# Patient Record
Sex: FEMALE | Race: WHITE | Employment: OTHER | URBAN - METROPOLITAN AREA
[De-identification: names, ages, dates, MRNs, and addresses within clinical notes are randomized per-mention and may not be internally consistent; named-entity substitution may affect disease eponyms.]

---

## 2018-05-03 ENCOUNTER — HOSPITAL ENCOUNTER (EMERGENCY)
Age: 63
Discharge: HOME | End: 2018-05-03
Payer: COMMERCIAL

## 2018-05-03 VITALS
DIASTOLIC BLOOD PRESSURE: 79 MMHG | RESPIRATION RATE: 18 BRPM | SYSTOLIC BLOOD PRESSURE: 136 MMHG | WEIGHT: 230 LBS | HEART RATE: 68 BPM | OXYGEN SATURATION: 97 % | BODY MASS INDEX: 37.12 KG/M2 | TEMPERATURE: 98.6 F

## 2018-05-03 DIAGNOSIS — N30.00 ACUTE CYSTITIS WITHOUT HEMATURIA: Primary | ICD-10-CM

## 2018-05-03 LAB
BACTERIA: ABNORMAL /HPF
BILIRUBIN URINE: NEGATIVE
BLOOD, URINE: ABNORMAL
CLARITY: ABNORMAL
COLOR: YELLOW
EPITHELIAL CELLS, UA: ABNORMAL /HPF
GLUCOSE URINE: NEGATIVE MG/DL
KETONES, URINE: NEGATIVE MG/DL
LEUKOCYTE ESTERASE, URINE: ABNORMAL
NITRITE, URINE: NEGATIVE
PH UA: 5.5 (ref 5–9)
PROTEIN UA: NEGATIVE MG/DL
RBC UA: ABNORMAL /HPF (ref 0–2)
SPECIFIC GRAVITY UA: 1.01 (ref 1–1.03)
UROBILINOGEN, URINE: 0.2 E.U./DL
WBC UA: >20 /HPF (ref 0–5)

## 2018-05-03 PROCEDURE — 81001 URINALYSIS AUTO W/SCOPE: CPT

## 2018-05-03 PROCEDURE — 87186 SC STD MICRODIL/AGAR DIL: CPT

## 2018-05-03 PROCEDURE — 99212 OFFICE O/P EST SF 10 MIN: CPT

## 2018-05-03 PROCEDURE — 87077 CULTURE AEROBIC IDENTIFY: CPT

## 2018-05-03 PROCEDURE — 87088 URINE BACTERIA CULTURE: CPT

## 2018-05-03 RX ORDER — CALCIUM CARBONATE 300MG(750)
TABLET,CHEWABLE ORAL EVERY OTHER DAY
COMMUNITY

## 2018-05-03 RX ORDER — CEPHALEXIN 500 MG/1
500 CAPSULE ORAL 3 TIMES DAILY
Qty: 21 CAPSULE | Refills: 0 | Status: SHIPPED | OUTPATIENT
Start: 2018-05-03 | End: 2018-05-10

## 2018-05-03 ASSESSMENT — PAIN DESCRIPTION - LOCATION: LOCATION: PERINEUM

## 2018-05-03 ASSESSMENT — PAIN SCALES - GENERAL: PAINLEVEL_OUTOF10: 8

## 2018-05-03 ASSESSMENT — PAIN DESCRIPTION - DESCRIPTORS: DESCRIPTORS: BURNING

## 2018-05-05 LAB
ORGANISM: ABNORMAL
URINE CULTURE, ROUTINE: ABNORMAL
URINE CULTURE, ROUTINE: ABNORMAL

## 2019-01-03 ENCOUNTER — HOSPITAL ENCOUNTER (OUTPATIENT)
Age: 64
Discharge: HOME | End: 2019-01-05
Payer: COMMERCIAL

## 2019-01-03 LAB
ALBUMIN SERPL-MCNC: 4 G/DL (ref 3.5–5.2)
ALP BLD-CCNC: 64 U/L (ref 35–104)
ALT SERPL-CCNC: 21 U/L (ref 0–32)
ANION GAP SERPL CALCULATED.3IONS-SCNC: 16 MMOL/L (ref 7–16)
AST SERPL-CCNC: 24 U/L (ref 0–31)
BASOPHILS ABSOLUTE: 0.02 E9/L (ref 0–0.2)
BASOPHILS RELATIVE PERCENT: 0.3 % (ref 0–2)
BILIRUB SERPL-MCNC: 0.4 MG/DL (ref 0–1.2)
BUN BLDV-MCNC: 12 MG/DL (ref 8–23)
CALCIUM SERPL-MCNC: 9 MG/DL (ref 8.6–10.2)
CHLORIDE BLD-SCNC: 100 MMOL/L (ref 98–107)
CHOLESTEROL, TOTAL: 156 MG/DL (ref 0–199)
CO2: 22 MMOL/L (ref 22–29)
CREAT SERPL-MCNC: 0.9 MG/DL (ref 0.5–1)
EOSINOPHILS ABSOLUTE: 0.14 E9/L (ref 0.05–0.5)
EOSINOPHILS RELATIVE PERCENT: 2.3 % (ref 0–6)
GFR AFRICAN AMERICAN: >60
GFR NON-AFRICAN AMERICAN: >60 ML/MIN/1.73
GLUCOSE BLD-MCNC: 91 MG/DL (ref 74–99)
HCT VFR BLD CALC: 40.4 % (ref 34–48)
HDLC SERPL-MCNC: 44 MG/DL
HEMOGLOBIN: 13 G/DL (ref 11.5–15.5)
IMMATURE GRANULOCYTES #: 0.02 E9/L
IMMATURE GRANULOCYTES %: 0.3 % (ref 0–5)
LDL CHOLESTEROL CALCULATED: 66 MG/DL (ref 0–99)
LYMPHOCYTES ABSOLUTE: 1.53 E9/L (ref 1.5–4)
LYMPHOCYTES RELATIVE PERCENT: 25.4 % (ref 20–42)
MCH RBC QN AUTO: 30.5 PG (ref 26–35)
MCHC RBC AUTO-ENTMCNC: 32.2 % (ref 32–34.5)
MCV RBC AUTO: 94.8 FL (ref 80–99.9)
MONOCYTES ABSOLUTE: 0.63 E9/L (ref 0.1–0.95)
MONOCYTES RELATIVE PERCENT: 10.4 % (ref 2–12)
NEUTROPHILS ABSOLUTE: 3.69 E9/L (ref 1.8–7.3)
NEUTROPHILS RELATIVE PERCENT: 61.3 % (ref 43–80)
PDW BLD-RTO: 13.2 FL (ref 11.5–15)
PLATELET # BLD: 387 E9/L (ref 130–450)
PMV BLD AUTO: 9.7 FL (ref 7–12)
POTASSIUM SERPL-SCNC: 4.3 MMOL/L (ref 3.5–5)
RBC # BLD: 4.26 E12/L (ref 3.5–5.5)
SODIUM BLD-SCNC: 138 MMOL/L (ref 132–146)
TOTAL PROTEIN: 7.2 G/DL (ref 6.4–8.3)
TRIGL SERPL-MCNC: 230 MG/DL (ref 0–149)
TSH SERPL DL<=0.05 MIU/L-ACNC: 2.55 UIU/ML (ref 0.27–4.2)
VITAMIN D 25-HYDROXY: 34 NG/ML (ref 30–100)
VLDLC SERPL CALC-MCNC: 46 MG/DL
WBC # BLD: 6 E9/L (ref 4.5–11.5)

## 2019-01-03 PROCEDURE — 80053 COMPREHEN METABOLIC PANEL: CPT

## 2019-01-03 PROCEDURE — 85025 COMPLETE CBC W/AUTO DIFF WBC: CPT

## 2019-01-03 PROCEDURE — 82306 VITAMIN D 25 HYDROXY: CPT

## 2019-01-03 PROCEDURE — 84443 ASSAY THYROID STIM HORMONE: CPT

## 2019-01-03 PROCEDURE — 80061 LIPID PANEL: CPT

## 2019-07-10 ENCOUNTER — HOSPITAL ENCOUNTER (OUTPATIENT)
Age: 64
Discharge: HOME | End: 2019-07-12
Payer: COMMERCIAL

## 2019-07-10 LAB
ALBUMIN SERPL-MCNC: 4.3 G/DL (ref 3.5–5.2)
ALP BLD-CCNC: 63 U/L (ref 35–104)
ALT SERPL-CCNC: 22 U/L (ref 0–32)
ANION GAP SERPL CALCULATED.3IONS-SCNC: 18 MMOL/L (ref 7–16)
AST SERPL-CCNC: 27 U/L (ref 0–31)
BASOPHILS ABSOLUTE: 0.03 E9/L (ref 0–0.2)
BASOPHILS RELATIVE PERCENT: 0.6 % (ref 0–2)
BILIRUB SERPL-MCNC: <0.2 MG/DL (ref 0–1.2)
BUN BLDV-MCNC: 14 MG/DL (ref 8–23)
CALCIUM SERPL-MCNC: 9.3 MG/DL (ref 8.6–10.2)
CHLORIDE BLD-SCNC: 102 MMOL/L (ref 98–107)
CHOLESTEROL, TOTAL: 189 MG/DL (ref 0–199)
CO2: 21 MMOL/L (ref 22–29)
CREAT SERPL-MCNC: 0.9 MG/DL (ref 0.5–1)
EOSINOPHILS ABSOLUTE: 0.14 E9/L (ref 0.05–0.5)
EOSINOPHILS RELATIVE PERCENT: 2.7 % (ref 0–6)
GFR AFRICAN AMERICAN: >60
GFR NON-AFRICAN AMERICAN: >60 ML/MIN/1.73
GLUCOSE BLD-MCNC: 86 MG/DL (ref 74–99)
HBA1C MFR BLD: 5.8 % (ref 4–5.6)
HCT VFR BLD CALC: 41.4 % (ref 34–48)
HDLC SERPL-MCNC: 43 MG/DL
HEMOGLOBIN: 13.3 G/DL (ref 11.5–15.5)
IMMATURE GRANULOCYTES #: 0.01 E9/L
IMMATURE GRANULOCYTES %: 0.2 % (ref 0–5)
LDL CHOLESTEROL CALCULATED: 92 MG/DL (ref 0–99)
LYMPHOCYTES ABSOLUTE: 1.17 E9/L (ref 1.5–4)
LYMPHOCYTES RELATIVE PERCENT: 22.8 % (ref 20–42)
MCH RBC QN AUTO: 30.9 PG (ref 26–35)
MCHC RBC AUTO-ENTMCNC: 32.1 % (ref 32–34.5)
MCV RBC AUTO: 96.1 FL (ref 80–99.9)
MONOCYTES ABSOLUTE: 0.57 E9/L (ref 0.1–0.95)
MONOCYTES RELATIVE PERCENT: 11.1 % (ref 2–12)
NEUTROPHILS ABSOLUTE: 3.21 E9/L (ref 1.8–7.3)
NEUTROPHILS RELATIVE PERCENT: 62.6 % (ref 43–80)
PDW BLD-RTO: 12.9 FL (ref 11.5–15)
PLATELET # BLD: 368 E9/L (ref 130–450)
PMV BLD AUTO: 9.6 FL (ref 7–12)
POTASSIUM SERPL-SCNC: 4.4 MMOL/L (ref 3.5–5)
RBC # BLD: 4.31 E12/L (ref 3.5–5.5)
SODIUM BLD-SCNC: 141 MMOL/L (ref 132–146)
TOTAL PROTEIN: 7.5 G/DL (ref 6.4–8.3)
TRIGL SERPL-MCNC: 271 MG/DL (ref 0–149)
TSH SERPL DL<=0.05 MIU/L-ACNC: 1.86 UIU/ML (ref 0.27–4.2)
VITAMIN D 25-HYDROXY: 39 NG/ML (ref 30–100)
VLDLC SERPL CALC-MCNC: 54 MG/DL
WBC # BLD: 5.1 E9/L (ref 4.5–11.5)

## 2019-07-10 PROCEDURE — 84443 ASSAY THYROID STIM HORMONE: CPT

## 2019-07-10 PROCEDURE — 80053 COMPREHEN METABOLIC PANEL: CPT

## 2019-07-10 PROCEDURE — 83036 HEMOGLOBIN GLYCOSYLATED A1C: CPT

## 2019-07-10 PROCEDURE — 82306 VITAMIN D 25 HYDROXY: CPT

## 2019-07-10 PROCEDURE — 85025 COMPLETE CBC W/AUTO DIFF WBC: CPT

## 2019-07-10 PROCEDURE — 80061 LIPID PANEL: CPT

## 2019-07-22 ENCOUNTER — HOSPITAL ENCOUNTER (OUTPATIENT)
Dept: MAMMOGRAPHY | Age: 64
Discharge: HOME | End: 2019-07-24
Payer: COMMERCIAL

## 2019-07-22 DIAGNOSIS — Z12.31 SCREENING MAMMOGRAM, ENCOUNTER FOR: ICD-10-CM

## 2019-07-22 PROCEDURE — 77063 BREAST TOMOSYNTHESIS BI: CPT

## 2020-07-24 ENCOUNTER — HOSPITAL ENCOUNTER (OUTPATIENT)
Dept: PREADMISSION TESTING | Age: 65
Discharge: HOME | End: 2020-07-24
Payer: MEDICAID

## 2020-07-24 ENCOUNTER — ANESTHESIA EVENT (OUTPATIENT)
Dept: OPERATING ROOM | Age: 65
DRG: 302 | End: 2020-07-24
Payer: MEDICAID

## 2020-07-24 ENCOUNTER — HOSPITAL ENCOUNTER (OUTPATIENT)
Dept: GENERAL RADIOLOGY | Age: 65
Discharge: HOME | End: 2020-07-26
Payer: MEDICAID

## 2020-07-24 ENCOUNTER — HOSPITAL ENCOUNTER (OUTPATIENT)
Age: 65
Discharge: HOME | End: 2020-07-26
Payer: MEDICAID

## 2020-07-24 ENCOUNTER — HOSPITAL ENCOUNTER (OUTPATIENT)
Dept: GENERAL RADIOLOGY | Age: 65
End: 2020-07-24
Payer: MEDICAID

## 2020-07-24 VITALS
DIASTOLIC BLOOD PRESSURE: 66 MMHG | HEART RATE: 88 BPM | TEMPERATURE: 97.5 F | SYSTOLIC BLOOD PRESSURE: 145 MMHG | OXYGEN SATURATION: 96 % | RESPIRATION RATE: 18 BRPM | BODY MASS INDEX: 36.66 KG/M2 | WEIGHT: 233.56 LBS | HEIGHT: 67 IN

## 2020-07-24 LAB
ANION GAP SERPL CALCULATED.3IONS-SCNC: 13 MMOL/L (ref 7–16)
APTT: 33.1 SEC (ref 24.5–35.1)
BASOPHILS ABSOLUTE: 0.02 E9/L (ref 0–0.2)
BASOPHILS RELATIVE PERCENT: 0.3 % (ref 0–2)
BUN BLDV-MCNC: 13 MG/DL (ref 8–23)
CALCIUM SERPL-MCNC: 9.6 MG/DL (ref 8.6–10.2)
CHLORIDE BLD-SCNC: 97 MMOL/L (ref 98–107)
CO2: 28 MMOL/L (ref 22–29)
CREAT SERPL-MCNC: 0.9 MG/DL (ref 0.5–1)
EKG ATRIAL RATE: 79 BPM
EKG P AXIS: 47 DEGREES
EKG P-R INTERVAL: 136 MS
EKG Q-T INTERVAL: 412 MS
EKG QRS DURATION: 90 MS
EKG QTC CALCULATION (BAZETT): 472 MS
EKG R AXIS: 7 DEGREES
EKG T AXIS: 31 DEGREES
EKG VENTRICULAR RATE: 79 BPM
EOSINOPHILS ABSOLUTE: 0.1 E9/L (ref 0.05–0.5)
EOSINOPHILS RELATIVE PERCENT: 1.5 % (ref 0–6)
GFR AFRICAN AMERICAN: >60
GFR NON-AFRICAN AMERICAN: >60 ML/MIN/1.73
GLUCOSE BLD-MCNC: 119 MG/DL (ref 74–99)
HCT VFR BLD CALC: 38.9 % (ref 34–48)
HEMOGLOBIN: 12.2 G/DL (ref 11.5–15.5)
IMMATURE GRANULOCYTES #: 0.02 E9/L
IMMATURE GRANULOCYTES %: 0.3 % (ref 0–5)
INR BLD: 1.1
LYMPHOCYTES ABSOLUTE: 1.47 E9/L (ref 1.5–4)
LYMPHOCYTES RELATIVE PERCENT: 22.7 % (ref 20–42)
MCH RBC QN AUTO: 28.6 PG (ref 26–35)
MCHC RBC AUTO-ENTMCNC: 31.4 % (ref 32–34.5)
MCV RBC AUTO: 91.3 FL (ref 80–99.9)
MONOCYTES ABSOLUTE: 0.49 E9/L (ref 0.1–0.95)
MONOCYTES RELATIVE PERCENT: 7.6 % (ref 2–12)
NEUTROPHILS ABSOLUTE: 4.37 E9/L (ref 1.8–7.3)
NEUTROPHILS RELATIVE PERCENT: 67.6 % (ref 43–80)
PDW BLD-RTO: 13.8 FL (ref 11.5–15)
PLATELET # BLD: 472 E9/L (ref 130–450)
PMV BLD AUTO: 8.6 FL (ref 7–12)
POTASSIUM REFLEX MAGNESIUM: 4.2 MMOL/L (ref 3.5–5)
PROTHROMBIN TIME: 12.1 SEC (ref 9.3–12.4)
RBC # BLD: 4.26 E12/L (ref 3.5–5.5)
SODIUM BLD-SCNC: 138 MMOL/L (ref 132–146)
WBC # BLD: 6.5 E9/L (ref 4.5–11.5)

## 2020-07-24 PROCEDURE — 85025 COMPLETE CBC W/AUTO DIFF WBC: CPT

## 2020-07-24 PROCEDURE — 36415 COLL VENOUS BLD VENIPUNCTURE: CPT

## 2020-07-24 PROCEDURE — 85730 THROMBOPLASTIN TIME PARTIAL: CPT

## 2020-07-24 PROCEDURE — 71046 X-RAY EXAM CHEST 2 VIEWS: CPT

## 2020-07-24 PROCEDURE — 80048 BASIC METABOLIC PNL TOTAL CA: CPT

## 2020-07-24 PROCEDURE — 87081 CULTURE SCREEN ONLY: CPT

## 2020-07-24 PROCEDURE — 93005 ELECTROCARDIOGRAM TRACING: CPT | Performed by: ANESTHESIOLOGY

## 2020-07-24 PROCEDURE — 85610 PROTHROMBIN TIME: CPT

## 2020-07-24 PROCEDURE — U0003 INFECTIOUS AGENT DETECTION BY NUCLEIC ACID (DNA OR RNA); SEVERE ACUTE RESPIRATORY SYNDROME CORONAVIRUS 2 (SARS-COV-2) (CORONAVIRUS DISEASE [COVID-19]), AMPLIFIED PROBE TECHNIQUE, MAKING USE OF HIGH THROUGHPUT TECHNOLOGIES AS DESCRIBED BY CMS-2020-01-R: HCPCS

## 2020-07-24 RX ORDER — ROPIVACAINE HYDROCHLORIDE 5 MG/ML
30 INJECTION, SOLUTION EPIDURAL; INFILTRATION; PERINEURAL
Status: CANCELLED | OUTPATIENT
Start: 2020-07-24 | End: 2020-07-24

## 2020-07-24 RX ORDER — POTASSIUM CHLORIDE 1500 MG/1
20 TABLET, FILM COATED, EXTENDED RELEASE ORAL
COMMUNITY

## 2020-07-24 RX ORDER — GLYCOPYRROLATE 1 MG/1
2 TABLET ORAL 2 TIMES DAILY
COMMUNITY
End: 2021-11-22

## 2020-07-24 RX ORDER — MAGNESIUM OXIDE 400 MG/1
250 TABLET ORAL DAILY
COMMUNITY

## 2020-07-24 RX ORDER — GABAPENTIN 300 MG/1
600 CAPSULE ORAL ONCE
Status: CANCELLED | OUTPATIENT
Start: 2020-07-24 | End: 2020-07-24

## 2020-07-24 RX ORDER — MIDAZOLAM HYDROCHLORIDE 1 MG/ML
1 INJECTION INTRAMUSCULAR; INTRAVENOUS EVERY 5 MIN PRN
Status: CANCELLED | OUTPATIENT
Start: 2020-07-24

## 2020-07-24 RX ORDER — FENTANYL CITRATE 50 UG/ML
100 INJECTION, SOLUTION INTRAMUSCULAR; INTRAVENOUS ONCE
Status: CANCELLED | OUTPATIENT
Start: 2020-07-24 | End: 2020-07-24

## 2020-07-24 NOTE — ANESTHESIA PRE PROCEDURE
Department of Anesthesiology  Preprocedure Note       Name:  Celine Domingo   Age:  59 y.o.  :  1955                                          MRN:  [id number]         Date:  2020      Surgeon: Linda Saleh):  Sierra Nissen, DO    Procedure: Procedure(s):  RIGHT TOTAL KNEE ARTHROPLASTY (DEPUY)    Medications prior to admission:   Prior to Admission medications    Medication Sig Start Date End Date Taking? Authorizing Provider   potassium chloride (KLOR-CON M) 20 MEQ TBCR extended release tablet Take 20 mEq by mouth daily (with breakfast)   Yes Historical Provider, MD   Lactobacillus (ACIDOPHILUS PO) Take 25 Million Cells by mouth daily 1 or 2   Yes Historical Provider, MD   magnesium oxide (MAG-OX) 400 MG tablet Take 400 mg by mouth daily   Yes Historical Provider, MD   glycopyrrolate (ROBINUL) 1 MG tablet Take 2 mg by mouth 2 times daily   Yes Historical Provider, MD   Melatonin 10 MG TBDP Take by mouth nightly     Historical Provider, MD   Multiple Vitamins-Minerals (HAIR SKIN AND NAILS FORMULA) TABS Take by mouth    Historical Provider, MD   vitamin B-12 (CYANOCOBALAMIN) 1000 MCG tablet Take 1,000 mcg by mouth daily. Historical Provider, MD   vitamin E 400 UNIT capsule Take 400 Units by mouth 2 times daily. Last dose 2012    Historical Provider, MD   Mesalamine (ASACOL HD) 800 MG TBEC Take 1 tablet by mouth 2 times daily. Historical Provider, MD   hydrOXYzine (ATARAX) 25 MG tablet Take 25 mg by mouth 2 times daily Takes one tablet in AM, two tablets in the PM    Historical Provider, MD   paroxetine (PAXIL) 20 MG tablet Take 25 mg by mouth every morning     Historical Provider, MD   acyclovir (ZOVIRAX) 800 MG tablet Take 800 mg by mouth daily. Historical Provider, MD   lisinopril (PRINIVIL;ZESTRIL) 10 MG tablet Take 10 mg by mouth daily. Historical Provider, MD   Cholecalciferol (VITAMIN D3) 2000 UNITS CAPS Take 1 tablet by mouth daily.     Historical Provider, MD   simvastatin (ZOCOR) 40 MG tablet Take 40 mg by mouth nightly. Historical Provider, MD   lansoprazole (PREVACID SOLUTAB) 30 MG disintegrating tablet Take 30 mg by mouth daily. Nightly     Historical Provider, MD   spironolactone (ALDACTONE) 25 MG tablet Take 25 mg by mouth 2 times daily. Historical Provider, MD   olopatadine (PATANOL) 0.1 % ophthalmic solution Place 1 drop into both eyes as needed. Historical Provider, MD       Current medications:    Current Outpatient Medications   Medication Sig Dispense Refill    potassium chloride (KLOR-CON M) 20 MEQ TBCR extended release tablet Take 20 mEq by mouth daily (with breakfast)      Lactobacillus (ACIDOPHILUS PO) Take 25 Million Cells by mouth daily 1 or 2      magnesium oxide (MAG-OX) 400 MG tablet Take 400 mg by mouth daily      glycopyrrolate (ROBINUL) 1 MG tablet Take 2 mg by mouth 2 times daily      Melatonin 10 MG TBDP Take by mouth nightly       Multiple Vitamins-Minerals (HAIR SKIN AND NAILS FORMULA) TABS Take by mouth      vitamin B-12 (CYANOCOBALAMIN) 1000 MCG tablet Take 1,000 mcg by mouth daily.  vitamin E 400 UNIT capsule Take 400 Units by mouth 2 times daily. Last dose 11/8/2012      Mesalamine (ASACOL HD) 800 MG TBEC Take 1 tablet by mouth 2 times daily.  hydrOXYzine (ATARAX) 25 MG tablet Take 25 mg by mouth 2 times daily Takes one tablet in AM, two tablets in the PM      paroxetine (PAXIL) 20 MG tablet Take 25 mg by mouth every morning       acyclovir (ZOVIRAX) 800 MG tablet Take 800 mg by mouth daily.  lisinopril (PRINIVIL;ZESTRIL) 10 MG tablet Take 10 mg by mouth daily.  Cholecalciferol (VITAMIN D3) 2000 UNITS CAPS Take 1 tablet by mouth daily.  simvastatin (ZOCOR) 40 MG tablet Take 40 mg by mouth nightly.  lansoprazole (PREVACID SOLUTAB) 30 MG disintegrating tablet Take 30 mg by mouth daily. Nightly       spironolactone (ALDACTONE) 25 MG tablet Take 25 mg by mouth 2 times daily.       olopatadine (PATANOL) 0.1 % ophthalmic solution Place 1 drop into both eyes as needed. No current facility-administered medications for this encounter. Allergies: Allergies   Allergen Reactions    Aspirin Other (See Comments)     bleeding    Iodine Hives and Swelling     Betadine   Shellfish    Medrol [Methylprednisolone] Anaphylaxis    Pork-Derived Products Swelling     Pig gut sutures    Rice Swelling    Sulfa Antibiotics Hives and Swelling    Tape Jaison Dye Tape] Rash    Codeine      Gallbladder attack    Darvocet [Propoxyphene N-Acetaminophen]      Crohn's affected    Other      bandaids rash    Plasticized Base [Plastibase]        Problem List:    Patient Active Problem List   Diagnosis Code    Crohn disease ([facility] K50.90    Hypertension I10    [facility] J44.9    Arthritis M19.90    Hyperlipidemia E78.5    GERD (gastroesophageal reflux disease) K21.9    Anxiety and depression F41.9, F32.9    Status post knee surgery Z98.890    Hypotension I95.9       Past Medical History:        Diagnosis Date    Anxiety and depression     Arthritis     Asthma     COPD (chronic obstructive pulmonary disease) (HCC)     Crohn disease (HCC)     IBS    GERD (gastroesophageal reflux disease)     Hyperlipidemia     Hypertension     Hypoglycemia     Nausea & vomiting     Sleep apnea     Thyroid disease     hx/no tx @ present       Past Surgical History:        Procedure Laterality Date    BLADDER SURGERY  bladder bx benign and urethra stretched    BLADDER SUSPENSION      cystocele repair also    COLONOSCOPY      ENDOSCOPY, COLON, DIAGNOSTIC      FRACTURE SURGERY      Rt.  Elbow    HYSTERECTOMY      partial    JOINT REPLACEMENT Left     knee    KNEE ARTHROSCOPY  06-04-12    left    KNEE ARTHROSCOPY      left    LITHOTRIPSY  12/20/2013    SKIN BIOPSY      TOTAL KNEE ARTHROPLASTY      Left Knee    UPPER GASTROINTESTINAL ENDOSCOPY         Social [address]    Drug/Infectious Status (If Applicable):  No results found for: HIV, HEPCAB    COVID-19 Screening (If Applicable): No results found for: COVID19      Anesthesia Evaluation  Patient summary reviewed history of anesthetic complications (Spinal Headache): Airway: Mallampati: II  TM distance: >3 FB   Neck ROM: full  Mouth opening: > = 3 FB Dental:    (+) upper dentures and partials  Comment: Upper Dentures / Lower Partial    Pulmonary: breath sounds clear to auscultation  (+) COPD:  sleep apnea: on CPAP,                            ROS comment: COVID TEST ORDERED 7/24/2020 - Neg   Cardiovascular:    (+) hypertension:, hyperlipidemia      ECG reviewed  Rhythm: regular  Rate: normal                    Neuro/Psych:   (+) depression/anxiety             GI/Hepatic/Renal:   (+) GERD:, renal disease: CRI, morbid obesity         ROS comment: Crohn disease (HCC)  IBS . Endo/Other:    (+) : arthritis: OA., .                  ROS comment: Hypoglycemia Abdominal:   (+) obese,         Vascular: negative vascular ROS. Anesthesia Plan      general     ASA 3             Anesthetic plan and risks discussed with patient. Plan discussed with CRNA. Patient agrees to RIGHT Adductor Canal Block for Post-op Pain Control and General Anesthesia (Refuses Spinal)      Christin Carter MD   7/24/2020    Pt seen, examined, chart reviewed, plan discussed.   No changes since seen in PAT on 7/24/2020    [facility]  7/30/2020  6:40 AM

## 2020-07-24 NOTE — PROGRESS NOTES
Left message at Dr. Olga Pineda office re: refusal to use/take inspirex, unable to use SHIVANI due to allergies.

## 2020-07-24 NOTE — PROGRESS NOTES
[address] [facility]                                                                                                                    PRE OP INSTRUCTIONS FOR  Eliz Parson        Date: 7/24/2020    Date of surgery: 7/30/2020   Arrival Time: Hospital will call you between 5pm and 7pm with your final arrival time for surgery    1. Do not eat or drink anything after midnight prior to surgery. This includes no water, chewing gum, mints or ice chips. 2. Take the following medications with a small sip of water on the morning of Surgery: pt does not want to take AM meds     3. Diabetics may take evening dose of insulin but none after midnight. If you feel symptomatic or low blood sugar morning of surgery drink 1-2 ounces of apple juice only. 4. Aspirin, Ibuprofen, Advil, Naproxen, Vitamin E and other Anti-inflammatory products should be stopped  before surgery  as directed by your physician. Take Tylenol only unless instructed otherwise by your surgeon. 5. Check with your Doctor regarding stopping Plavix, Coumadin, Lovenox, Eliquis, Effient, or other blood thinners. 6. Do not smoke,use illicit drugs and do not drink any alcoholic beverages 24 hours prior to surgery. 7. You may brush your teeth the morning of surgery. DO NOT SWALLOW WATER    8. You MUST make arrangements for a responsible adult to take you home after your surgery. You will not be allowed to leave alone or drive yourself home. It is strongly suggested someone stay with you the first 24 hrs. Your surgery will be cancelled if you do not have a ride home. 9. PEDIATRIC PATIENTS ONLY:  A parent/legal guardian must accompany a child scheduled for surgery and plan to stay at the hospital until the child is discharged. Please do not bring other children with you.     10. Please wear simple, loose fitting clothing to the hospital.  Do not bring valuables (money, credit cards, checkbooks, etc.) Do not wear any makeup (including

## 2020-07-24 NOTE — PROGRESS NOTES
Patient attended preoperative [facility] on 7/24/2020 during her preadmission testing appointment. Patient is scheduled to have an elective knee replacement. Patient was educated regarding Disease Process, Medications, Smoking Cessation, Oxygenation, Incentive Spirometry and Deep Breath and Cough, signs and symptoms of postoperative joint infection that include: Fever, Chills, Pain Control, Drainage and Redness, post-op follow up with orthopaedic surgeon, dressing removal, staple removal, ambulatory devices which include a standard walker and cane, bed mobility, correct anatomical alignment, active range of motion, proper transferring technique, incision care, infection prevention measures, non-pharmacologic comfort measures, notification of inadequate pain control measures, pain scale for assessing level of pain, pharmacologic pain management, relaxation techniques.

## 2020-07-26 LAB
MRSA CULTURE ONLY: NORMAL
SARS-COV-2: NOT DETECTED
SOURCE: NORMAL

## 2020-07-27 NOTE — PROGRESS NOTES
Crist Leyden at Dr. Robert Rizvi office notified that pt refuses a blood transfusion, refusal consent signed in PAT, and refusal to take SHIVANI wipes and allergy to betadine.

## 2020-07-30 ENCOUNTER — HOSPITAL ENCOUNTER (INPATIENT)
Age: 65
LOS: 1 days | Discharge: NURSING, REHAB OR LONG-TERM CARE | DRG: 302 | End: 2020-07-31
Attending: ORTHOPAEDIC SURGERY | Admitting: ORTHOPAEDIC SURGERY
Payer: MEDICAID

## 2020-07-30 ENCOUNTER — ANESTHESIA (OUTPATIENT)
Dept: OPERATING ROOM | Age: 65
DRG: 302 | End: 2020-07-30
Payer: MEDICAID

## 2020-07-30 ENCOUNTER — APPOINTMENT (OUTPATIENT)
Dept: GENERAL RADIOLOGY | Age: 65
DRG: 302 | End: 2020-07-30
Attending: ORTHOPAEDIC SURGERY
Payer: MEDICAID

## 2020-07-30 VITALS
DIASTOLIC BLOOD PRESSURE: 73 MMHG | SYSTOLIC BLOOD PRESSURE: 150 MMHG | RESPIRATION RATE: 14 BRPM | OXYGEN SATURATION: 99 %

## 2020-07-30 PROBLEM — M17.11 ARTHRITIS OF RIGHT KNEE: Status: ACTIVE | Noted: 2020-07-30

## 2020-07-30 LAB — METER GLUCOSE: 118 MG/DL (ref 74–99)

## 2020-07-30 PROCEDURE — 6370000000 HC RX 637 (ALT 250 FOR IP): Performed by: ORTHOPAEDIC SURGERY

## 2020-07-30 PROCEDURE — 73560 X-RAY EXAM OF KNEE 1 OR 2: CPT

## 2020-07-30 PROCEDURE — 6360000002 HC RX W HCPCS: Performed by: ORTHOPAEDIC SURGERY

## 2020-07-30 PROCEDURE — 0SRC0J9 REPLACEMENT OF RIGHT KNEE JOINT WITH SYNTHETIC SUBSTITUTE, CEMENTED, OPEN APPROACH: ICD-10-PCS | Performed by: ORTHOPAEDIC SURGERY

## 2020-07-30 PROCEDURE — 2580000003 HC RX 258: Performed by: ORTHOPAEDIC SURGERY

## 2020-07-30 PROCEDURE — 3E0T3BZ INTRODUCTION OF ANESTHETIC AGENT INTO PERIPHERAL NERVES AND PLEXI, PERCUTANEOUS APPROACH: ICD-10-PCS | Performed by: ORTHOPAEDIC SURGERY

## 2020-07-30 PROCEDURE — 3600000015 HC SURGERY LEVEL 5 ADDTL 15MIN: Performed by: ORTHOPAEDIC SURGERY

## 2020-07-30 PROCEDURE — 2500000003 HC RX 250 WO HCPCS

## 2020-07-30 PROCEDURE — 97110 THERAPEUTIC EXERCISES: CPT | Performed by: PHYSICAL THERAPIST

## 2020-07-30 PROCEDURE — 7100000001 HC PACU RECOVERY - ADDTL 15 MIN: Performed by: ORTHOPAEDIC SURGERY

## 2020-07-30 PROCEDURE — 6370000000 HC RX 637 (ALT 250 FOR IP): Performed by: STUDENT IN AN ORGANIZED HEALTH CARE EDUCATION/TRAINING PROGRAM

## 2020-07-30 PROCEDURE — 6360000002 HC RX W HCPCS: Performed by: ANESTHESIOLOGY

## 2020-07-30 PROCEDURE — 1200000000 HC SEMI PRIVATE

## 2020-07-30 PROCEDURE — 64447 NJX AA&/STRD FEMORAL NRV IMG: CPT | Performed by: ANESTHESIOLOGY

## 2020-07-30 PROCEDURE — 3600000005 HC SURGERY LEVEL 5 BASE: Performed by: ORTHOPAEDIC SURGERY

## 2020-07-30 PROCEDURE — 6360000002 HC RX W HCPCS

## 2020-07-30 PROCEDURE — 97161 PT EVAL LOW COMPLEX 20 MIN: CPT | Performed by: PHYSICAL THERAPIST

## 2020-07-30 PROCEDURE — C1776 JOINT DEVICE (IMPLANTABLE): HCPCS | Performed by: ORTHOPAEDIC SURGERY

## 2020-07-30 PROCEDURE — 88311 DECALCIFY TISSUE: CPT

## 2020-07-30 PROCEDURE — 97530 THERAPEUTIC ACTIVITIES: CPT

## 2020-07-30 PROCEDURE — 2709999900 HC NON-CHARGEABLE SUPPLY: Performed by: ORTHOPAEDIC SURGERY

## 2020-07-30 PROCEDURE — 88305 TISSUE EXAM BY PATHOLOGIST: CPT

## 2020-07-30 PROCEDURE — 82962 GLUCOSE BLOOD TEST: CPT

## 2020-07-30 PROCEDURE — 7100000000 HC PACU RECOVERY - FIRST 15 MIN: Performed by: ORTHOPAEDIC SURGERY

## 2020-07-30 PROCEDURE — 3700000000 HC ANESTHESIA ATTENDED CARE: Performed by: ORTHOPAEDIC SURGERY

## 2020-07-30 PROCEDURE — 97530 THERAPEUTIC ACTIVITIES: CPT | Performed by: PHYSICAL THERAPIST

## 2020-07-30 PROCEDURE — 3700000001 HC ADD 15 MINUTES (ANESTHESIA): Performed by: ORTHOPAEDIC SURGERY

## 2020-07-30 PROCEDURE — 97165 OT EVAL LOW COMPLEX 30 MIN: CPT

## 2020-07-30 DEVICE — INSERT TIB SZ 6 THK10MM KNEE POST STBL FIX BEAR ATTUNE: Type: IMPLANTABLE DEVICE | Site: KNEE | Status: FUNCTIONAL

## 2020-07-30 DEVICE — CEMENT BNE 40GM FULL DOSE PMMA W/O GENT M VISC N RADPQ LNG: Type: IMPLANTABLE DEVICE | Site: KNEE | Status: FUNCTIONAL

## 2020-07-30 DEVICE — COMPONENT PAT DIA32MM KNEE POLY CEM MEDIALIZED ANAT ATTUNE: Type: IMPLANTABLE DEVICE | Site: PATELLA | Status: FUNCTIONAL

## 2020-07-30 DEVICE — BASEPLATE TIB SZ 5 FIX BEAR CEM S+ TECHNOLOGY ATTUNE: Type: IMPLANTABLE DEVICE | Site: KNEE | Status: FUNCTIONAL

## 2020-07-30 DEVICE — COMPONENT FEM SZ 6 R KNEE POST STBL CEM ATTUNE: Type: IMPLANTABLE DEVICE | Site: KNEE | Status: FUNCTIONAL

## 2020-07-30 DEVICE — KNEE K1 TOT HEMI STD CEM IMPL CAPPED SYNTHES: Type: IMPLANTABLE DEVICE | Site: KNEE | Status: FUNCTIONAL

## 2020-07-30 RX ORDER — ONDANSETRON 2 MG/ML
INJECTION INTRAMUSCULAR; INTRAVENOUS PRN
Status: DISCONTINUED | OUTPATIENT
Start: 2020-07-30 | End: 2020-07-30 | Stop reason: SDUPTHER

## 2020-07-30 RX ORDER — LIDOCAINE HYDROCHLORIDE 20 MG/ML
INJECTION, SOLUTION INTRAVENOUS PRN
Status: DISCONTINUED | OUTPATIENT
Start: 2020-07-30 | End: 2020-07-30 | Stop reason: SDUPTHER

## 2020-07-30 RX ORDER — ROPIVACAINE HYDROCHLORIDE 5 MG/ML
30 INJECTION, SOLUTION EPIDURAL; INFILTRATION; PERINEURAL
Status: COMPLETED | OUTPATIENT
Start: 2020-07-30 | End: 2020-07-30

## 2020-07-30 RX ORDER — FENTANYL CITRATE 50 UG/ML
100 INJECTION, SOLUTION INTRAMUSCULAR; INTRAVENOUS ONCE
Status: COMPLETED | OUTPATIENT
Start: 2020-07-30 | End: 2020-07-30

## 2020-07-30 RX ORDER — ROCURONIUM BROMIDE 10 MG/ML
INJECTION, SOLUTION INTRAVENOUS PRN
Status: DISCONTINUED | OUTPATIENT
Start: 2020-07-30 | End: 2020-07-30 | Stop reason: SDUPTHER

## 2020-07-30 RX ORDER — FENTANYL CITRATE 50 UG/ML
25 INJECTION, SOLUTION INTRAMUSCULAR; INTRAVENOUS EVERY 5 MIN PRN
Status: DISCONTINUED | OUTPATIENT
Start: 2020-07-30 | End: 2020-07-30 | Stop reason: HOSPADM

## 2020-07-30 RX ORDER — CEFAZOLIN SODIUM 2 G/50ML
2 SOLUTION INTRAVENOUS
Status: COMPLETED | OUTPATIENT
Start: 2020-07-30 | End: 2020-07-30

## 2020-07-30 RX ORDER — MIDAZOLAM HYDROCHLORIDE 1 MG/ML
INJECTION INTRAMUSCULAR; INTRAVENOUS PRN
Status: DISCONTINUED | OUTPATIENT
Start: 2020-07-30 | End: 2020-07-30 | Stop reason: SDUPTHER

## 2020-07-30 RX ORDER — FENTANYL CITRATE 50 UG/ML
INJECTION, SOLUTION INTRAMUSCULAR; INTRAVENOUS
Status: COMPLETED
Start: 2020-07-30 | End: 2020-07-30

## 2020-07-30 RX ORDER — ROPIVACAINE HYDROCHLORIDE 5 MG/ML
INJECTION, SOLUTION EPIDURAL; INFILTRATION; PERINEURAL
Status: COMPLETED
Start: 2020-07-30 | End: 2020-07-30

## 2020-07-30 RX ORDER — OXYCODONE HYDROCHLORIDE 5 MG/1
10 TABLET ORAL EVERY 4 HOURS PRN
Status: DISCONTINUED | OUTPATIENT
Start: 2020-07-30 | End: 2020-07-31 | Stop reason: HOSPADM

## 2020-07-30 RX ORDER — SPIRONOLACTONE 25 MG/1
25 TABLET ORAL 2 TIMES DAILY
Status: DISCONTINUED | OUTPATIENT
Start: 2020-07-30 | End: 2020-07-31 | Stop reason: HOSPADM

## 2020-07-30 RX ORDER — LISINOPRIL 10 MG/1
10 TABLET ORAL DAILY
Status: DISCONTINUED | OUTPATIENT
Start: 2020-07-30 | End: 2020-07-31 | Stop reason: HOSPADM

## 2020-07-30 RX ORDER — SODIUM CHLORIDE 9 MG/ML
INJECTION, SOLUTION INTRAVENOUS CONTINUOUS
Status: DISCONTINUED | OUTPATIENT
Start: 2020-07-30 | End: 2020-07-30

## 2020-07-30 RX ORDER — GLYCOPYRROLATE 1 MG/1
2 TABLET ORAL 2 TIMES DAILY
Status: DISCONTINUED | OUTPATIENT
Start: 2020-07-30 | End: 2020-07-31 | Stop reason: HOSPADM

## 2020-07-30 RX ORDER — MIDAZOLAM HYDROCHLORIDE 1 MG/ML
1 INJECTION INTRAMUSCULAR; INTRAVENOUS EVERY 5 MIN PRN
Status: COMPLETED | OUTPATIENT
Start: 2020-07-30 | End: 2020-07-30

## 2020-07-30 RX ORDER — PROPOFOL 10 MG/ML
INJECTION, EMULSION INTRAVENOUS PRN
Status: DISCONTINUED | OUTPATIENT
Start: 2020-07-30 | End: 2020-07-30 | Stop reason: SDUPTHER

## 2020-07-30 RX ORDER — SODIUM CHLORIDE 9 MG/ML
INJECTION, SOLUTION INTRAVENOUS CONTINUOUS
Status: DISCONTINUED | OUTPATIENT
Start: 2020-07-30 | End: 2020-07-31 | Stop reason: HOSPADM

## 2020-07-30 RX ORDER — SODIUM CHLORIDE 0.9 % (FLUSH) 0.9 %
10 SYRINGE (ML) INJECTION EVERY 12 HOURS SCHEDULED
Status: DISCONTINUED | OUTPATIENT
Start: 2020-07-30 | End: 2020-07-31 | Stop reason: HOSPADM

## 2020-07-30 RX ORDER — FENTANYL CITRATE 50 UG/ML
INJECTION, SOLUTION INTRAMUSCULAR; INTRAVENOUS PRN
Status: DISCONTINUED | OUTPATIENT
Start: 2020-07-30 | End: 2020-07-30 | Stop reason: SDUPTHER

## 2020-07-30 RX ORDER — VANCOMYCIN HYDROCHLORIDE 1 G/20ML
INJECTION, POWDER, LYOPHILIZED, FOR SOLUTION INTRAVENOUS PRN
Status: DISCONTINUED | OUTPATIENT
Start: 2020-07-30 | End: 2020-07-30 | Stop reason: ALTCHOICE

## 2020-07-30 RX ORDER — PANTOPRAZOLE SODIUM 40 MG/1
40 TABLET, DELAYED RELEASE ORAL
Status: DISCONTINUED | OUTPATIENT
Start: 2020-07-31 | End: 2020-07-31 | Stop reason: HOSPADM

## 2020-07-30 RX ORDER — POTASSIUM CHLORIDE 20 MEQ/1
20 TABLET, EXTENDED RELEASE ORAL
Status: DISCONTINUED | OUTPATIENT
Start: 2020-07-31 | End: 2020-07-31 | Stop reason: HOSPADM

## 2020-07-30 RX ORDER — ACYCLOVIR 400 MG/1
800 TABLET ORAL DAILY
Status: DISCONTINUED | OUTPATIENT
Start: 2020-07-30 | End: 2020-07-31 | Stop reason: HOSPADM

## 2020-07-30 RX ORDER — PROMETHAZINE HYDROCHLORIDE 25 MG/ML
25 INJECTION, SOLUTION INTRAMUSCULAR; INTRAVENOUS PRN
Status: DISCONTINUED | OUTPATIENT
Start: 2020-07-30 | End: 2020-07-30 | Stop reason: HOSPADM

## 2020-07-30 RX ORDER — ONDANSETRON 2 MG/ML
4 INJECTION INTRAMUSCULAR; INTRAVENOUS EVERY 6 HOURS PRN
Status: DISCONTINUED | OUTPATIENT
Start: 2020-07-30 | End: 2020-07-31 | Stop reason: HOSPADM

## 2020-07-30 RX ORDER — GABAPENTIN 300 MG/1
600 CAPSULE ORAL ONCE
Status: DISCONTINUED | OUTPATIENT
Start: 2020-07-30 | End: 2020-07-30 | Stop reason: ALTCHOICE

## 2020-07-30 RX ORDER — HYDROMORPHONE HCL 110MG/55ML
PATIENT CONTROLLED ANALGESIA SYRINGE INTRAVENOUS PRN
Status: DISCONTINUED | OUTPATIENT
Start: 2020-07-30 | End: 2020-07-30 | Stop reason: SDUPTHER

## 2020-07-30 RX ORDER — NEOSTIGMINE METHYLSULFATE 1 MG/ML
INJECTION, SOLUTION INTRAVENOUS PRN
Status: DISCONTINUED | OUTPATIENT
Start: 2020-07-30 | End: 2020-07-30 | Stop reason: SDUPTHER

## 2020-07-30 RX ORDER — SODIUM CHLORIDE 0.9 % (FLUSH) 0.9 %
10 SYRINGE (ML) INJECTION PRN
Status: DISCONTINUED | OUTPATIENT
Start: 2020-07-30 | End: 2020-07-31 | Stop reason: HOSPADM

## 2020-07-30 RX ORDER — LABETALOL HYDROCHLORIDE 5 MG/ML
5 INJECTION, SOLUTION INTRAVENOUS EVERY 10 MIN PRN
Status: DISCONTINUED | OUTPATIENT
Start: 2020-07-30 | End: 2020-07-30 | Stop reason: HOSPADM

## 2020-07-30 RX ORDER — GLYCOPYRROLATE 1 MG/5 ML
SYRINGE (ML) INTRAVENOUS PRN
Status: DISCONTINUED | OUTPATIENT
Start: 2020-07-30 | End: 2020-07-30 | Stop reason: SDUPTHER

## 2020-07-30 RX ORDER — MIDAZOLAM HYDROCHLORIDE 1 MG/ML
INJECTION INTRAMUSCULAR; INTRAVENOUS
Status: COMPLETED
Start: 2020-07-30 | End: 2020-07-30

## 2020-07-30 RX ORDER — SENNA AND DOCUSATE SODIUM 50; 8.6 MG/1; MG/1
1 TABLET, FILM COATED ORAL 2 TIMES DAILY
Status: DISCONTINUED | OUTPATIENT
Start: 2020-07-30 | End: 2020-07-31 | Stop reason: HOSPADM

## 2020-07-30 RX ORDER — HYDROXYZINE HYDROCHLORIDE 25 MG/1
25 TABLET, FILM COATED ORAL 2 TIMES DAILY
Status: DISCONTINUED | OUTPATIENT
Start: 2020-07-30 | End: 2020-07-31 | Stop reason: HOSPADM

## 2020-07-30 RX ORDER — FENTANYL CITRATE 50 UG/ML
50 INJECTION, SOLUTION INTRAMUSCULAR; INTRAVENOUS EVERY 5 MIN PRN
Status: DISCONTINUED | OUTPATIENT
Start: 2020-07-30 | End: 2020-07-30 | Stop reason: HOSPADM

## 2020-07-30 RX ORDER — HYDROMORPHONE HYDROCHLORIDE 1 MG/ML
1 INJECTION, SOLUTION INTRAMUSCULAR; INTRAVENOUS; SUBCUTANEOUS
Status: DISCONTINUED | OUTPATIENT
Start: 2020-07-30 | End: 2020-07-31 | Stop reason: HOSPADM

## 2020-07-30 RX ORDER — OXYCODONE HYDROCHLORIDE 5 MG/1
5 TABLET ORAL EVERY 6 HOURS PRN
Qty: 28 TABLET | Refills: 0 | Status: SHIPPED | OUTPATIENT
Start: 2020-07-30 | End: 2020-08-06

## 2020-07-30 RX ORDER — SODIUM CHLORIDE 0.9 % (FLUSH) 0.9 %
10 SYRINGE (ML) INJECTION PRN
Status: DISCONTINUED | OUTPATIENT
Start: 2020-07-30 | End: 2020-07-30 | Stop reason: HOSPADM

## 2020-07-30 RX ORDER — ROPIVACAINE HYDROCHLORIDE 5 MG/ML
INJECTION, SOLUTION EPIDURAL; INFILTRATION; PERINEURAL
Status: COMPLETED | OUTPATIENT
Start: 2020-07-30 | End: 2020-07-30

## 2020-07-30 RX ORDER — DIPHENHYDRAMINE HYDROCHLORIDE 50 MG/ML
INJECTION INTRAMUSCULAR; INTRAVENOUS PRN
Status: DISCONTINUED | OUTPATIENT
Start: 2020-07-30 | End: 2020-07-30 | Stop reason: SDUPTHER

## 2020-07-30 RX ORDER — SODIUM CHLORIDE 0.9 % (FLUSH) 0.9 %
10 SYRINGE (ML) INJECTION EVERY 12 HOURS SCHEDULED
Status: DISCONTINUED | OUTPATIENT
Start: 2020-07-30 | End: 2020-07-30 | Stop reason: HOSPADM

## 2020-07-30 RX ORDER — CHLORHEXIDINE GLUCONATE 4 G/100ML
SOLUTION TOPICAL DAILY PRN
Status: DISCONTINUED | OUTPATIENT
Start: 2020-07-30 | End: 2020-07-30 | Stop reason: HOSPADM

## 2020-07-30 RX ORDER — MEPERIDINE HYDROCHLORIDE 25 MG/ML
12.5 INJECTION INTRAMUSCULAR; INTRAVENOUS; SUBCUTANEOUS EVERY 5 MIN PRN
Status: DISCONTINUED | OUTPATIENT
Start: 2020-07-30 | End: 2020-07-30 | Stop reason: HOSPADM

## 2020-07-30 RX ADMIN — PROPOFOL 150 MG: 10 INJECTION, EMULSION INTRAVENOUS at 08:07

## 2020-07-30 RX ADMIN — CEFAZOLIN SODIUM 2 G: 2 SOLUTION INTRAVENOUS at 08:10

## 2020-07-30 RX ADMIN — Medication 3 MG: at 10:33

## 2020-07-30 RX ADMIN — ROCURONIUM BROMIDE 30 MG: 10 SOLUTION INTRAVENOUS at 08:07

## 2020-07-30 RX ADMIN — PHENYLEPHRINE HYDROCHLORIDE 100 MCG: 10 INJECTION INTRAVENOUS at 08:19

## 2020-07-30 RX ADMIN — MIDAZOLAM HYDROCHLORIDE 1 MG: 1 INJECTION, SOLUTION INTRAMUSCULAR; INTRAVENOUS at 07:49

## 2020-07-30 RX ADMIN — ROPIVACAINE HYDROCHLORIDE 30 ML: 5 INJECTION, SOLUTION EPIDURAL; INFILTRATION; PERINEURAL at 08:50

## 2020-07-30 RX ADMIN — FENTANYL CITRATE 50 MCG: 50 INJECTION, SOLUTION INTRAMUSCULAR; INTRAVENOUS at 08:30

## 2020-07-30 RX ADMIN — SODIUM CHLORIDE: 9 INJECTION, SOLUTION INTRAVENOUS at 17:45

## 2020-07-30 RX ADMIN — FENTANYL CITRATE 100 MCG: 50 INJECTION, SOLUTION INTRAMUSCULAR; INTRAVENOUS at 07:48

## 2020-07-30 RX ADMIN — ROCURONIUM BROMIDE 20 MG: 10 SOLUTION INTRAVENOUS at 09:24

## 2020-07-30 RX ADMIN — SODIUM CHLORIDE: 9 INJECTION, SOLUTION INTRAVENOUS at 09:36

## 2020-07-30 RX ADMIN — FENTANYL CITRATE 100 MCG: 50 INJECTION, SOLUTION INTRAMUSCULAR; INTRAVENOUS at 08:07

## 2020-07-30 RX ADMIN — FENTANYL CITRATE 50 MCG: 50 INJECTION, SOLUTION INTRAMUSCULAR; INTRAVENOUS at 11:17

## 2020-07-30 RX ADMIN — FENTANYL CITRATE 50 MCG: 50 INJECTION, SOLUTION INTRAMUSCULAR; INTRAVENOUS at 11:10

## 2020-07-30 RX ADMIN — LIDOCAINE HYDROCHLORIDE 100 MG: 20 INJECTION, SOLUTION INTRAVENOUS at 08:07

## 2020-07-30 RX ADMIN — FENTANYL CITRATE 50 MCG: 50 INJECTION, SOLUTION INTRAMUSCULAR; INTRAVENOUS at 09:58

## 2020-07-30 RX ADMIN — ROPIVACAINE HYDROCHLORIDE 30 ML: 5 INJECTION, SOLUTION EPIDURAL; INFILTRATION; PERINEURAL at 07:59

## 2020-07-30 RX ADMIN — ONDANSETRON 4 MG: 2 INJECTION INTRAMUSCULAR; INTRAVENOUS at 10:22

## 2020-07-30 RX ADMIN — APIXABAN 2.5 MG: 2.5 TABLET, FILM COATED ORAL at 22:16

## 2020-07-30 RX ADMIN — OXYCODONE HYDROCHLORIDE 10 MG: 5 TABLET ORAL at 17:25

## 2020-07-30 RX ADMIN — HYDROXYZINE HYDROCHLORIDE 25 MG: 25 TABLET ORAL at 22:16

## 2020-07-30 RX ADMIN — FENTANYL CITRATE 50 MCG: 50 INJECTION, SOLUTION INTRAMUSCULAR; INTRAVENOUS at 09:20

## 2020-07-30 RX ADMIN — SODIUM CHLORIDE: 9 INJECTION, SOLUTION INTRAVENOUS at 08:02

## 2020-07-30 RX ADMIN — GLYCOPYRROLATE 2 MG: 1 TABLET ORAL at 21:20

## 2020-07-30 RX ADMIN — SPIRONOLACTONE 25 MG: 25 TABLET ORAL at 21:20

## 2020-07-30 RX ADMIN — HYDROMORPHONE HYDROCHLORIDE 1 MG: 1 INJECTION, SOLUTION INTRAMUSCULAR; INTRAVENOUS; SUBCUTANEOUS at 19:23

## 2020-07-30 RX ADMIN — HYDROMORPHONE HYDROCHLORIDE 1 MG: 1 INJECTION, SOLUTION INTRAMUSCULAR; INTRAVENOUS; SUBCUTANEOUS at 13:06

## 2020-07-30 RX ADMIN — FENTANYL CITRATE 50 MCG: 50 INJECTION, SOLUTION INTRAMUSCULAR; INTRAVENOUS at 10:17

## 2020-07-30 RX ADMIN — DIPHENHYDRAMINE HYDROCHLORIDE 12.5 MG: 50 INJECTION, SOLUTION INTRAMUSCULAR; INTRAVENOUS at 10:22

## 2020-07-30 RX ADMIN — HYDROMORPHONE HYDROCHLORIDE 0.5 MG: 2 INJECTION, SOLUTION INTRAMUSCULAR; INTRAVENOUS; SUBCUTANEOUS at 10:51

## 2020-07-30 RX ADMIN — MIDAZOLAM 2 MG: 1 INJECTION INTRAMUSCULAR; INTRAVENOUS at 08:02

## 2020-07-30 RX ADMIN — ROCURONIUM BROMIDE 20 MG: 10 SOLUTION INTRAVENOUS at 08:39

## 2020-07-30 RX ADMIN — Medication 0.6 MG: at 10:33

## 2020-07-30 RX ADMIN — MIDAZOLAM HYDROCHLORIDE 1 MG: 1 INJECTION, SOLUTION INTRAMUSCULAR; INTRAVENOUS at 07:48

## 2020-07-30 RX ADMIN — SODIUM CHLORIDE: 9 INJECTION, SOLUTION INTRAVENOUS at 07:07

## 2020-07-30 ASSESSMENT — PULMONARY FUNCTION TESTS
PIF_VALUE: 27
PIF_VALUE: 27
PIF_VALUE: 25
PIF_VALUE: 26
PIF_VALUE: 27
PIF_VALUE: 27
PIF_VALUE: 26
PIF_VALUE: 23
PIF_VALUE: 19
PIF_VALUE: 27
PIF_VALUE: 19
PIF_VALUE: 23
PIF_VALUE: 26
PIF_VALUE: 19
PIF_VALUE: 23
PIF_VALUE: 26
PIF_VALUE: 22
PIF_VALUE: 22
PIF_VALUE: 7
PIF_VALUE: 22
PIF_VALUE: 23
PIF_VALUE: 26
PIF_VALUE: 23
PIF_VALUE: 2
PIF_VALUE: 23
PIF_VALUE: 17
PIF_VALUE: 23
PIF_VALUE: 23
PIF_VALUE: 27
PIF_VALUE: 22
PIF_VALUE: 19
PIF_VALUE: 22
PIF_VALUE: 27
PIF_VALUE: 26
PIF_VALUE: 6
PIF_VALUE: 1
PIF_VALUE: 19
PIF_VALUE: 26
PIF_VALUE: 23
PIF_VALUE: 22
PIF_VALUE: 28
PIF_VALUE: 22
PIF_VALUE: 22
PIF_VALUE: 19
PIF_VALUE: 21
PIF_VALUE: 25
PIF_VALUE: 27
PIF_VALUE: 23
PIF_VALUE: 23
PIF_VALUE: 21
PIF_VALUE: 23
PIF_VALUE: 24
PIF_VALUE: 24
PIF_VALUE: 22
PIF_VALUE: 22
PIF_VALUE: 23
PIF_VALUE: 21
PIF_VALUE: 24
PIF_VALUE: 1
PIF_VALUE: 1
PIF_VALUE: 27
PIF_VALUE: 26
PIF_VALUE: 22
PIF_VALUE: 17
PIF_VALUE: 23
PIF_VALUE: 19
PIF_VALUE: 19
PIF_VALUE: 26
PIF_VALUE: 20
PIF_VALUE: 23
PIF_VALUE: 17
PIF_VALUE: 21
PIF_VALUE: 26
PIF_VALUE: 26
PIF_VALUE: 23
PIF_VALUE: 19
PIF_VALUE: 23
PIF_VALUE: 19
PIF_VALUE: 27
PIF_VALUE: 19
PIF_VALUE: 24
PIF_VALUE: 23
PIF_VALUE: 22
PIF_VALUE: 23
PIF_VALUE: 27
PIF_VALUE: 23
PIF_VALUE: 28
PIF_VALUE: 19
PIF_VALUE: 26
PIF_VALUE: 26
PIF_VALUE: 27
PIF_VALUE: 22
PIF_VALUE: 24
PIF_VALUE: 19
PIF_VALUE: 27
PIF_VALUE: 23
PIF_VALUE: 27
PIF_VALUE: 20
PIF_VALUE: 22
PIF_VALUE: 23
PIF_VALUE: 17
PIF_VALUE: 26
PIF_VALUE: 1
PIF_VALUE: 22
PIF_VALUE: 20
PIF_VALUE: 19
PIF_VALUE: 23
PIF_VALUE: 19
PIF_VALUE: 19
PIF_VALUE: 22
PIF_VALUE: 24
PIF_VALUE: 23
PIF_VALUE: 23
PIF_VALUE: 22
PIF_VALUE: 19
PIF_VALUE: 19
PIF_VALUE: 22
PIF_VALUE: 23
PIF_VALUE: 22
PIF_VALUE: 24
PIF_VALUE: 22
PIF_VALUE: 23
PIF_VALUE: 1
PIF_VALUE: 19
PIF_VALUE: 26
PIF_VALUE: 20
PIF_VALUE: 26
PIF_VALUE: 36
PIF_VALUE: 26
PIF_VALUE: 22
PIF_VALUE: 23
PIF_VALUE: 26
PIF_VALUE: 23
PIF_VALUE: 27
PIF_VALUE: 26
PIF_VALUE: 22
PIF_VALUE: 1
PIF_VALUE: 26
PIF_VALUE: 21
PIF_VALUE: 23
PIF_VALUE: 22
PIF_VALUE: 19
PIF_VALUE: 21
PIF_VALUE: 27
PIF_VALUE: 21
PIF_VALUE: 27
PIF_VALUE: 19
PIF_VALUE: 22
PIF_VALUE: 15
PIF_VALUE: 26
PIF_VALUE: 31
PIF_VALUE: 22
PIF_VALUE: 23
PIF_VALUE: 19
PIF_VALUE: 27
PIF_VALUE: 20
PIF_VALUE: 26
PIF_VALUE: 27

## 2020-07-30 ASSESSMENT — PAIN SCALES - GENERAL
PAINLEVEL_OUTOF10: 6
PAINLEVEL_OUTOF10: 9
PAINLEVEL_OUTOF10: 9
PAINLEVEL_OUTOF10: 0
PAINLEVEL_OUTOF10: 7
PAINLEVEL_OUTOF10: 6
PAINLEVEL_OUTOF10: 7
PAINLEVEL_OUTOF10: 10
PAINLEVEL_OUTOF10: 7
PAINLEVEL_OUTOF10: 10
PAINLEVEL_OUTOF10: 0
PAINLEVEL_OUTOF10: 6
PAINLEVEL_OUTOF10: 10
PAINLEVEL_OUTOF10: 5
PAINLEVEL_OUTOF10: 8
PAINLEVEL_OUTOF10: 7

## 2020-07-30 ASSESSMENT — PAIN DESCRIPTION - LOCATION
LOCATION: KNEE

## 2020-07-30 ASSESSMENT — PAIN DESCRIPTION - ORIENTATION
ORIENTATION: RIGHT

## 2020-07-30 ASSESSMENT — PAIN DESCRIPTION - DESCRIPTORS
DESCRIPTORS: ACHING;BURNING
DESCRIPTORS: ACHING;DISCOMFORT;SORE
DESCRIPTORS: BURNING;ACHING
DESCRIPTORS: ACHING;BURNING
DESCRIPTORS: BURNING;ACHING
DESCRIPTORS: ACHING

## 2020-07-30 ASSESSMENT — PAIN DESCRIPTION - PAIN TYPE
TYPE: SURGICAL PAIN

## 2020-07-30 ASSESSMENT — PAIN DESCRIPTION - PROGRESSION
CLINICAL_PROGRESSION: GRADUALLY IMPROVING
CLINICAL_PROGRESSION: GRADUALLY IMPROVING
CLINICAL_PROGRESSION: NOT CHANGED
CLINICAL_PROGRESSION: NOT CHANGED
CLINICAL_PROGRESSION: GRADUALLY IMPROVING
CLINICAL_PROGRESSION: NOT CHANGED
CLINICAL_PROGRESSION: GRADUALLY IMPROVING

## 2020-07-30 ASSESSMENT — PAIN - FUNCTIONAL ASSESSMENT: PAIN_FUNCTIONAL_ASSESSMENT: 0-10

## 2020-07-30 NOTE — PROGRESS NOTES
OCCUPATIONAL THERAPY  Initial Evaluation  Date:2020  Patient Name: Jo Benz  MRN: [id number]  : 1955  ROOM #: 0315/[id number]     Referring Provider: Pieter Wilcox DO   Evaluating OT: Joan Garnett OTR/L [id number]    Placement Recommendation: [address] with 24/7 assistance    Recommended Adaptive Equipment: none     [facility]   AM-PAC Daily Activity Inpatient   How much help for putting on and taking off regular lower body clothing?: Total  How much help for Bathing?: A Lot  How much help for Toileting?: Total  How much help for putting on and taking off regular upper body clothing?: A Lot  How much help for taking care of personal grooming?: A Little  How much help for eating meals?: None  AM-PAC Inpatient Daily Activity Raw Score: 13  AM-PAC Inpatient ADL T-Scale Score : 32.03  ADL Inpatient CMS 0-100% Score: 63.03  ADL Inpatient CMS G-Code Modifier : CL    Diagnosis:   1. Preop testing    2. Status post knee surgery      Pertinent Medical History:   Past Medical History:   Diagnosis Date    Anesthesia     blood pressure goes low during surgery    Anxiety and depression     Arthritis     Crohn disease ([facility]     IBS    GERD (gastroesophageal reflux disease)     Hyperlipidemia     Hypertension     Hypoglycemia     Nausea & vomiting     Sleep apnea     Thyroid disease     hx/no tx @ present        Precautions:  falls, full weight bearing: R LE d/t TKA   Pain Scale: Numeric Rate: 10/10 in R knee; Nursing notified. Social history: alone       Drive: yes, van   Home architecture: Apartment, elevator, tub shower   PLOF: independent with BADL and IADL, ambulated with wheeled walker   Equipment owned: walker, shower chair, tub bench, grab bars, elevated toilet seat, rollator, hand held shower head  Cognition: oriented x 4; follows 3 step directions.     good  Problem solving skills   good  Memory    good  Sequencing  Communication: intact   Visual perceptual skills: intact     Glasses: yes   Edema: no Sensation: intact   Hand Dominance:  Left     X Right     Left Right Comment   Passive range of motion [facility]     Active range of motion [facility]     Muscle Grade 4/5 4/5    /pinch Strength Intact  Intact     Additional Information: Good  and wfl FMC/dexterity noted during ADL tasks Good  and wfl FMC/dexterity noted during ADL tasks      Functional Assessment:   Initial Eval Status  Date: 7/30/2020 Treatment Status  Date: STGs = LTGs  Time frame: 5-7 days   Feeding Independent   Independent    Grooming Minimal Assist   Independent    UB Dressing Moderate Assist   Independent    LB Dressing Dependent   Independent    Bathing Moderate Assist  Modified [place]    Toileting Dependent   Modified [place]    Bed Mobility  Supine to sit: Supervision   Scooting:Supervision  Sit to supine: Supervision with increased time    Supine to sit: Independent   Sit to supine: Independent    Functional Transfers Minimal assist from EOB to wheeled walker with bed height elevated   Independent    Functional Mobility Minimal assist x 2 with wheeled walker, 4 side steps towards head of bed  Modified [place]    Activity Tolerance Fair   Good      Balance:   Sitting: good   Standing: fair     Endurance: fair     Comments: Upon arrival to the room the patient was supine. At end of the session, the patient was supine. Call light and phone within reach. All lines and tubes intact. Overall patient demonstrated decreased independence and safety during completion of ADL/functional transfer/mobility tasks. Pt would benefit from continued skilled OT to increase safety and independence with completion of ADL/IADL tasks for functional independence and quality of life.      Treatment: OT eval, pt educated and trained in weight bearing status, therapist facilitated bed mobility, sitting balance at EOB to increase dynamic sitting balance and activity tolerance, transfer training with verbal cues for hand placement, static standing balance with wheeled walker, functional mobility with wheeled walker, verbal cues for walker sequence and safety, pt returned to  bed at end of eval. Education provided for proper positioning of lower extremity in bed to prevent contractures. All needs within reach. Pt would benefit from continued skilled OT to increase safety and independence with completion of ADL/IADL tasks for functional independence and quality of life. Evaluation Complexity:  · Low Complexity  · History: Brief history including review of medical records relating to the problem  · Exam: 1-3 performance Deficits  · Assistance/Modification: No assistance or modifications required to perform tasks. No comorbities affecting occupational performance. Assessment of current deficits   Functional mobility [x]        ADLs [x]        Strength [x]      Cognition []  Functional transfers  [x]       IADLs [x]        Safety Awareness []      Endurance [x]  Fine Motor Coordination []       Balance [x]       Vision/perception []     Sensation []   Gross Motor Coordination []       ROM []         Delirium []                          Motor Control []    Plan of Care: OT 1-3x/week PRN during hospitalization  ADL retraining [x]   Equipment needs [x]   Neuromuscular re-education [] Energy Conservation Techniques [x]  Functional Transfer training [x] Patient and/or Family Education [x]  Functional Mobility training [x]  Environmental Modifications []  Cognitive re-training []   Compensatory techniques for ADLs [x]  Splinting Needs []   Positioning to improve overall function [x]   Therapeutic Activity [x]  Therapeutic Exercise  []  Visual/Perceptual: []    Delirium prevention/treatment  []  Other:  []    Rehab Potential: Good for established goals developed with patient and family. Patient / Family Goal: return home      Patient and/or family were instructed on functional diagnosis, prognosis/goals and OT plan of care.  Demonstrated fair

## 2020-07-30 NOTE — PROGRESS NOTES
730 to pacu for right adductor block connected to all monitors and O2 applied at 3 liters nasal canula  747 time out performed and premedicated per orders  750 block started utilizing ultrasound  758 30cc 0.5% ropivacaine injectedto right adductor canal. Tolerated procedure well

## 2020-07-30 NOTE — ANESTHESIA POSTPROCEDURE EVALUATION
Department of Anesthesiology  Postprocedure Note    Patient: Cortney Means  MRN: [id number]  YOB: 1955  Date of evaluation: 7/30/2020  Time:  1:51 PM     Procedure Summary     Date:  07/30/20 Room / Location:  [address] [place] [address] / [address] [place] [address]    Anesthesia Start:  0802 Anesthesia Stop:  1559    Procedure:  RIGHT TOTAL KNEE ARTHROPLASTY (DEPUY) (Right Knee) Diagnosis:  (ADVANCED ARTHRITIS RIGHT KNEE)    Surgeon:  Catalino Brooke DO Responsible Provider:  Angelica Ibarra MD    Anesthesia Type:  general ASA Status:  3          Anesthesia Type: general    Dennis Phase I: Dennis Score: 9    Dennis Phase II:      Last vitals: Reviewed and per EMR flowsheets.        Anesthesia Post Evaluation    Patient location during evaluation: PACU  Patient participation: complete - patient participated  Level of consciousness: awake  Airway patency: patent  Nausea & Vomiting: no nausea and no vomiting  Complications: no  Cardiovascular status: hemodynamically stable  Respiratory status: acceptable  Hydration status: stable

## 2020-07-30 NOTE — H&P
Department of Orthopedic Surgery  Resident H&P Note          CHIEF COMPLAINT:  R knee pain    HISTORY OF PRESENT ILLNESS:                The patient is a 59 y.o. female who presents with R knee pain. Patient has undergone non operative treatment and continues to have right knee pain. Patient has had a R TKA by Dr. Kathya Landeros in the past.  Denies numbness/tingling/paresthesias. Denies pain elsewhere. Past Medical History:        Diagnosis Date    Anesthesia     blood pressure goes low during surgery    Anxiety and depression     Arthritis     Asthma     COPD (chronic obstructive pulmonary disease) ([address]     Crohn disease (HCC)     IBS    GERD (gastroesophageal reflux disease)     Hyperlipidemia     Hypertension     Hypoglycemia     Nausea & vomiting     Sleep apnea     Thyroid disease     hx/no tx @ present     Past Surgical History:        Procedure Laterality Date    BLADDER SURGERY  bladder bx benign and urethra stretched    BLADDER SUSPENSION      cystocele repair also    COLONOSCOPY      ENDOSCOPY, COLON, DIAGNOSTIC      FRACTURE SURGERY      Rt.  Elbow    HYSTERECTOMY      partial    JOINT REPLACEMENT Left     knee    KNEE ARTHROSCOPY  06-04-12    left    KNEE ARTHROSCOPY      left    LITHOTRIPSY  12/20/2013    SKIN BIOPSY      TOTAL KNEE ARTHROPLASTY      Left Knee    UPPER GASTROINTESTINAL ENDOSCOPY       Current Medications:   Current Facility-Administered Medications: 0.9 % sodium chloride infusion, , Intravenous, Continuous  sodium chloride flush 0.9 % injection 10 mL, 10 mL, Intravenous, 2 times per day  sodium chloride flush 0.9 % injection 10 mL, 10 mL, Intravenous, PRN  ceFAZolin (ANCEF) 2 g in dextrose 3 % 50 mL IVPB (duplex), 2 g, Intravenous, On Call to OR  chlorhexidine (HIBICLENS) 4 % liquid, , Topical, Daily PRN  promethazine (PHENERGAN) injection 25 mg, 25 mg, Intravenous, PRN  labetalol (NORMODYNE;TRANDATE) injection 5 mg, 5 mg, Intravenous, Q10 Min PRN  meperidine (DEMEROL) injection 12.5 mg, 12.5 mg, Intravenous, Q5 Min PRN  fentaNYL (SUBLIMAZE) injection 25 mcg, 25 mcg, Intravenous, Q5 Min PRN  fentaNYL (SUBLIMAZE) injection 50 mcg, 50 mcg, Intravenous, Q5 Min PRN  fentaNYL (SUBLIMAZE) injection 100 mcg, 100 mcg, Intravenous, Once  midazolam (VERSED) injection 1 mg, 1 mg, Intravenous, Q5 Min PRN  ropivacaine (NAROPIN) 0.5% injection 30 mL, 30 mL, Infiltration, Once PRN  fentaNYL (SUBLIMAZE) 100 MCG/2ML injection, , ,   midazolam (VERSED) 2 MG/2ML injection, , ,   ropivacaine (NAROPIN) 0.5% injection, , ,   Allergies:  Aspirin; Iodine; Medrol [methylprednisolone]; Pork-derived products; Rice; Sulfa antibiotics; Tape [adhesive tape]; Codeine; Darvocet [propoxyphene n-acetaminophen]; Other; and Plasticized base [plastibase]    Social History:   TOBACCO:   reports that she has never smoked. She has never used smokeless tobacco.  ETOH:   reports no history of alcohol use. DRUGS:   reports no history of drug use.   ACTIVITIES OF DAILY LIVING:    OCCUPATION:    Family History:       Problem Relation Age of Onset    Cancer Mother     Heart Disease Father     Cancer Sister     Heart Disease Brother        REVIEW OF SYSTEMS:  CONSTITUTIONAL:  negative for  fevers, chills  EYES:  negative for blurred vision, visual disturbance  HEENT:  negative for  hearing loss, voice change  RESPIRATORY:  negative for  dyspnea, wheezing  CARDIOVASCULAR:  negative for  chest pain, palpitations  GASTROINTESTINAL:  negative for nausea, vomiting  GENITOURINARY:  negative for frequency, urinary incontinence  HEMATOLOGIC/LYMPHATIC:  negative for bleeding and petechiae  MUSCULOSKELETAL:  positive for  pain  NEUROLOGICAL:  negative for headaches, dizziness  BEHAVIOR/PSYCH:  negative for increased agitation and anxiety    PHYSICAL EXAM:    VITALS:  /88   Pulse 83   Temp 97.9 °F (36.6 °C) (Temporal)   Resp 16   Ht 5' 7\" (1.702 m)   Wt 235 lb 9 oz (106.9 kg)   SpO2 97%   BMI 36.89 kg/m²   CONSTITUTIONAL:  Awake, alert, answers questions appropriately  EYES:  Lids and lashes normal, pupils equal, round and reactive to light, extra ocular muscles intact  HENT:  Normocephalic, without obvious abnormality, atraumatic, neck supple, symmetric  LUNGS:  No increased work of breathing  CARDIOVASCULAR:  Brisk vascular capillary refill to all extremities  ABDOMEN:  Non-tender, Non-distended  NEUROLOGIC:  Awake, alert, oriented to name, place and time. Cranial nerves II-XII are grossly intact.   MUSCULOSKELETAL:  Right lower Extremity:   Skin is intact circumferentially  +TTP medial and lateral joint space  Compartments soft and compressible  Palpable dorsalis pedis and posterior tibialis pulse, brisk cap refill to toes, foot warm and perfused  Sensation intact to light touch in sural/deep peroneal/superficial peroneal/saphenous/posterior tibial nerve distributions to foot/ankle  Demonstrates active ankle plantar/dorsiflexion/great toe extension      Secondary Exam:   · bilateralUE: -TTP to fingers, hand, wrist, forearm, elbow, humerus, shoulder or clavicle, patient able to flex/extend fingers, wrist, elbow and shoulder with active and passive ROM without pain, +2/4 Radial & Ulnar pulses, cap refill <3sec, +AIN/PIN/Radial/Ulanr/Median N, distal sensation grossly intact to C4-T1 dermatomes, compartments soft and compressible  · leftLE: Negative Stinchfields; -TTP to foot, ankle, leg, knee, thigh, hip; patient able to flex/extend toes, ankle, knee and hip with active and passive ROM without pain,+2/4 DP & PT pulses, cap refill <3sec, +5/5 PF/DF/EHL, distal sensation grossly intact to L3-S1 dermatomes, compartments soft and compressible  · Pelvis: -TTP, -Log roll, -Heel strike     DATA:    CBC:   Lab Results   Component Value Date    WBC 6.5 07/24/2020    RBC 4.26 07/24/2020    HGB 12.2 07/24/2020    HCT 38.9 07/24/2020    MCV 91.3 07/24/2020    MCH 28.6 07/24/2020    MCHC 31.4 07/24/2020    RDW 13.8 07/24/2020     07/24/2020    MPV 8.6 07/24/2020     PT/INR:    Lab Results   Component Value Date    PROTIME 12.1 07/24/2020    INR 1.1 07/24/2020     Radiology Review:    · Xray R knee demonstrates a slight varus aligned knee with medial joint space narrowing, sclerosis    IMPRESSION:  · R knee DJD    PLAN:  · NPO  · Plan for R TKA   I have explained the risks and complications of the recommended surgery with the patient at length, as well as discussed potential treatment alternatives including nonoperative management. These risks include but are not limited to death or complication from anesthesia, continued pain, nerve tendon or vascular injury, infection, hematoma, deep vein thrombosis or pulmonary embolism, and need for further surgery, etc. Patient understood this, asked appropriate questions, which were all answered, and elected to proceed with the procedure.   · Discussed with Dr. Hayder Hess

## 2020-07-30 NOTE — PLAN OF CARE
Problem: Pain:  Goal: Pain level will decrease  Description: Pain level will decrease  Outcome: Met This Shift     Problem: Pain:  Goal: Control of acute pain  Description: Control of acute pain  Outcome: Met This Shift     Problem: Falls - Risk of:  Goal: Will remain free from falls  Description: Will remain free from falls  Outcome: Met This Shift     Problem: Falls - Risk of:  Goal: Absence of physical injury  Description: Absence of physical injury  Outcome: Met This Shift

## 2020-07-30 NOTE — OP NOTE
[address]                                OPERATIVE REPORT    PATIENT NAME: Melanie Thayer                       :        1955  MED REC NO:   [id number]                            ROOM:       0315  ACCOUNT NO:   [de-identified]                           ADMIT DATE: 2020  PROVIDER:     Whitney Boss    DATE OF PROCEDURE:  2020    PREOPERATIVE DIAGNOSIS:  Arthritis, right knee. POSTOPERATIVE DIAGNOSIS:  Arthritis, right knee. OPERATION PERFORMED:  Cemented right total knee replacement. SURGEON:  Whitney Boss MD    OPERATIVE PROCEDURE:  The patient was taken to the operative theater,  where a general anesthetic was induced by the Department of  Anesthesiology. The right knee, leg and foot were prepped and draped in  usual sterile fashion. Tourniquet applied to the proximal thigh and  inflated. Anterior midline incision was placed over the front of the  right knee. Medial pericapsular incision was performed. We encountered  large 25-30 mL of yellowish effusion in the right knee joint. Advanced  arthritis was seen in all compartments of the knee joint today. The  kneecap was everted and a patella osteotomy was performed with a power  saw. A size 30-mm trial patellar button seemed to fit quite nicely. The knee was then flexed to 95 degrees. The appropriate intramedullary  jig was placed in the distal femur canal.  Appropriate distal femur  osteotomy cuts were performed with a power saw. A size 6 trial femoral  component seemed to fit quite nicely. The appropriate tibial jig was  then applied to the right leg. The appropriate tibial plateau osteotomy  cuts were performed with a power saw. A size 5 tibial plate seemed to  fit quite nicely with 10-mm thick plastic tray. Knee was then reduced  and all three trial components seemed to fit together quite well.   She  had full

## 2020-07-30 NOTE — ANESTHESIA PROCEDURE NOTES
RIGHT Adductor Canal Block    Patient location during procedure: pre-op  Start time: 7/30/2020 7:45 AM  End time: 7/30/2020 8:00 AM  Staffing  Anesthesiologist: Christin Carter MD  Performed: anesthesiologist   Preanesthetic Checklist  Completed: patient identified, site marked, surgical consent, pre-op evaluation, timeout performed, IV checked, risks and benefits discussed, monitors and equipment checked, anesthesia consent given, oxygen available and patient being monitored  Peripheral Block  Patient position: supine  Prep: ChloraPrep  Patient monitoring: continuous pulse ox, frequent blood pressure checks, IV access and cardiac monitor  Block type: Saphenous  Laterality: right  Injection technique: single-shot  Procedures: ultrasound guided  Local infiltration: ropivacaine  Infiltration strength: 0.5 %  Dose: 30 mL  Provider prep: mask, sterile gloves and sterile gown  Local infiltration: ropivacaine  Needle  Needle type: combined needle/nerve stimulator   Needle gauge: 22 G  Needle length: 10 cm  Needle localization: ultrasound guidance  Assessment  Injection assessment: negative aspiration for heme, no paresthesia on injection and local visualized surrounding nerve on ultrasound  Paresthesia pain: none  Slow fractionated injection: yes  Hemodynamics: stable  Medications Administered  Ropivacaine (NAROPIN) injection 0.5%, 30 mL  Reason for block: post-op pain management

## 2020-07-31 VITALS
WEIGHT: 235.56 LBS | OXYGEN SATURATION: 98 % | BODY MASS INDEX: 36.97 KG/M2 | TEMPERATURE: 98.8 F | DIASTOLIC BLOOD PRESSURE: 68 MMHG | HEIGHT: 67 IN | HEART RATE: 99 BPM | RESPIRATION RATE: 17 BRPM | SYSTOLIC BLOOD PRESSURE: 142 MMHG

## 2020-07-31 LAB
ALBUMIN SERPL-MCNC: 3.2 G/DL (ref 3.5–5.2)
ALP BLD-CCNC: 55 U/L (ref 35–104)
ALT SERPL-CCNC: 10 U/L (ref 0–32)
ANION GAP SERPL CALCULATED.3IONS-SCNC: 12 MMOL/L (ref 7–16)
AST SERPL-CCNC: 13 U/L (ref 0–31)
BASOPHILS ABSOLUTE: 0.01 E9/L (ref 0–0.2)
BASOPHILS RELATIVE PERCENT: 0.1 % (ref 0–2)
BILIRUB SERPL-MCNC: 0.5 MG/DL (ref 0–1.2)
BUN BLDV-MCNC: 10 MG/DL (ref 8–23)
CALCIUM SERPL-MCNC: 8.4 MG/DL (ref 8.6–10.2)
CHLORIDE BLD-SCNC: 100 MMOL/L (ref 98–107)
CHOLESTEROL, TOTAL: 113 MG/DL (ref 0–199)
CK MB: 2.4 NG/ML (ref 0–4.3)
CO2: 24 MMOL/L (ref 22–29)
CREAT SERPL-MCNC: 0.6 MG/DL (ref 0.5–1)
EOSINOPHILS ABSOLUTE: 0.02 E9/L (ref 0.05–0.5)
EOSINOPHILS RELATIVE PERCENT: 0.3 % (ref 0–6)
GFR AFRICAN AMERICAN: >60
GFR NON-AFRICAN AMERICAN: >60 ML/MIN/1.73
GLUCOSE BLD-MCNC: 113 MG/DL (ref 74–99)
HBA1C MFR BLD: 6 % (ref 4–5.6)
HCT VFR BLD CALC: 31 % (ref 34–48)
HDLC SERPL-MCNC: 45 MG/DL
HEMOGLOBIN: 9.6 G/DL (ref 11.5–15.5)
IMMATURE GRANULOCYTES #: 0.02 E9/L
IMMATURE GRANULOCYTES %: 0.3 % (ref 0–5)
LDL CHOLESTEROL CALCULATED: 52 MG/DL (ref 0–99)
LYMPHOCYTES ABSOLUTE: 1.01 E9/L (ref 1.5–4)
LYMPHOCYTES RELATIVE PERCENT: 13.2 % (ref 20–42)
MAGNESIUM: 2 MG/DL (ref 1.6–2.6)
MCH RBC QN AUTO: 28.7 PG (ref 26–35)
MCHC RBC AUTO-ENTMCNC: 31 % (ref 32–34.5)
MCV RBC AUTO: 92.8 FL (ref 80–99.9)
MONOCYTES ABSOLUTE: 1.12 E9/L (ref 0.1–0.95)
MONOCYTES RELATIVE PERCENT: 14.6 % (ref 2–12)
NEUTROPHILS ABSOLUTE: 5.5 E9/L (ref 1.8–7.3)
NEUTROPHILS RELATIVE PERCENT: 71.5 % (ref 43–80)
PDW BLD-RTO: 14.1 FL (ref 11.5–15)
PHOSPHORUS: 3.3 MG/DL (ref 2.5–4.5)
PLATELET # BLD: 409 E9/L (ref 130–450)
PMV BLD AUTO: 8.7 FL (ref 7–12)
POTASSIUM SERPL-SCNC: 4.1 MMOL/L (ref 3.5–5)
RBC # BLD: 3.34 E12/L (ref 3.5–5.5)
SODIUM BLD-SCNC: 136 MMOL/L (ref 132–146)
TOTAL CK: 121 U/L (ref 20–180)
TOTAL PROTEIN: 6.3 G/DL (ref 6.4–8.3)
TRIGL SERPL-MCNC: 79 MG/DL (ref 0–149)
TROPONIN: <0.01 NG/ML (ref 0–0.03)
TSH SERPL DL<=0.05 MIU/L-ACNC: 1.53 UIU/ML (ref 0.27–4.2)
VLDLC SERPL CALC-MCNC: 16 MG/DL
WBC # BLD: 7.7 E9/L (ref 4.5–11.5)

## 2020-07-31 PROCEDURE — 97530 THERAPEUTIC ACTIVITIES: CPT

## 2020-07-31 PROCEDURE — 82550 ASSAY OF CK (CPK): CPT

## 2020-07-31 PROCEDURE — 82553 CREATINE MB FRACTION: CPT

## 2020-07-31 PROCEDURE — 84443 ASSAY THYROID STIM HORMONE: CPT

## 2020-07-31 PROCEDURE — 36415 COLL VENOUS BLD VENIPUNCTURE: CPT

## 2020-07-31 PROCEDURE — 6370000000 HC RX 637 (ALT 250 FOR IP): Performed by: STUDENT IN AN ORGANIZED HEALTH CARE EDUCATION/TRAINING PROGRAM

## 2020-07-31 PROCEDURE — 84100 ASSAY OF PHOSPHORUS: CPT

## 2020-07-31 PROCEDURE — 85025 COMPLETE CBC W/AUTO DIFF WBC: CPT

## 2020-07-31 PROCEDURE — 2700000000 HC OXYGEN THERAPY PER DAY

## 2020-07-31 PROCEDURE — 97535 SELF CARE MNGMENT TRAINING: CPT

## 2020-07-31 PROCEDURE — 80061 LIPID PANEL: CPT

## 2020-07-31 PROCEDURE — 6360000002 HC RX W HCPCS: Performed by: ORTHOPAEDIC SURGERY

## 2020-07-31 PROCEDURE — 6370000000 HC RX 637 (ALT 250 FOR IP): Performed by: ORTHOPAEDIC SURGERY

## 2020-07-31 PROCEDURE — 80053 COMPREHEN METABOLIC PANEL: CPT

## 2020-07-31 PROCEDURE — 97116 GAIT TRAINING THERAPY: CPT

## 2020-07-31 PROCEDURE — 83036 HEMOGLOBIN GLYCOSYLATED A1C: CPT

## 2020-07-31 PROCEDURE — 97110 THERAPEUTIC EXERCISES: CPT

## 2020-07-31 PROCEDURE — 83735 ASSAY OF MAGNESIUM: CPT

## 2020-07-31 PROCEDURE — 84484 ASSAY OF TROPONIN QUANT: CPT

## 2020-07-31 PROCEDURE — 6360000002 HC RX W HCPCS: Performed by: INTERNAL MEDICINE

## 2020-07-31 RX ORDER — METHOCARBAMOL 750 MG/1
1500 TABLET, FILM COATED ORAL 4 TIMES DAILY
Status: DISCONTINUED | OUTPATIENT
Start: 2020-07-31 | End: 2020-07-31 | Stop reason: HOSPADM

## 2020-07-31 RX ADMIN — HYDROMORPHONE HYDROCHLORIDE 1 MG: 1 INJECTION, SOLUTION INTRAMUSCULAR; INTRAVENOUS; SUBCUTANEOUS at 05:05

## 2020-07-31 RX ADMIN — ACYCLOVIR 800 MG: 400 TABLET ORAL at 09:37

## 2020-07-31 RX ADMIN — ONDANSETRON HYDROCHLORIDE 4 MG: 2 SOLUTION INTRAMUSCULAR; INTRAVENOUS at 14:44

## 2020-07-31 RX ADMIN — METHOCARBAMOL 1500 MG: 750 TABLET ORAL at 07:51

## 2020-07-31 RX ADMIN — HYDROXYZINE HYDROCHLORIDE 25 MG: 25 TABLET ORAL at 08:52

## 2020-07-31 RX ADMIN — OXYCODONE HYDROCHLORIDE 10 MG: 5 TABLET ORAL at 12:47

## 2020-07-31 RX ADMIN — PANTOPRAZOLE SODIUM 40 MG: 40 TABLET, DELAYED RELEASE ORAL at 05:05

## 2020-07-31 RX ADMIN — HYDROMORPHONE HYDROCHLORIDE 1 MG: 1 INJECTION, SOLUTION INTRAMUSCULAR; INTRAVENOUS; SUBCUTANEOUS at 00:45

## 2020-07-31 RX ADMIN — GLYCOPYRROLATE 2 MG: 1 TABLET ORAL at 08:54

## 2020-07-31 RX ADMIN — HYDROMORPHONE HYDROCHLORIDE 1 MG: 1 INJECTION, SOLUTION INTRAMUSCULAR; INTRAVENOUS; SUBCUTANEOUS at 10:08

## 2020-07-31 RX ADMIN — OXYCODONE HYDROCHLORIDE 10 MG: 5 TABLET ORAL at 08:50

## 2020-07-31 RX ADMIN — APIXABAN 2.5 MG: 2.5 TABLET, FILM COATED ORAL at 08:53

## 2020-07-31 RX ADMIN — LISINOPRIL 10 MG: 10 TABLET ORAL at 08:53

## 2020-07-31 RX ADMIN — HYDROMORPHONE HYDROCHLORIDE 1 MG: 1 INJECTION, SOLUTION INTRAMUSCULAR; INTRAVENOUS; SUBCUTANEOUS at 13:46

## 2020-07-31 RX ADMIN — METHOCARBAMOL 1500 MG: 750 TABLET ORAL at 16:35

## 2020-07-31 RX ADMIN — POTASSIUM CHLORIDE 20 MEQ: 20 TABLET, EXTENDED RELEASE ORAL at 08:52

## 2020-07-31 RX ADMIN — SPIRONOLACTONE 25 MG: 25 TABLET ORAL at 08:53

## 2020-07-31 ASSESSMENT — PAIN SCALES - GENERAL
PAINLEVEL_OUTOF10: 10
PAINLEVEL_OUTOF10: 7
PAINLEVEL_OUTOF10: 10
PAINLEVEL_OUTOF10: 3
PAINLEVEL_OUTOF10: 9
PAINLEVEL_OUTOF10: 10

## 2020-07-31 ASSESSMENT — PAIN DESCRIPTION - PROGRESSION
CLINICAL_PROGRESSION: GRADUALLY IMPROVING
CLINICAL_PROGRESSION: NOT CHANGED
CLINICAL_PROGRESSION: GRADUALLY IMPROVING
CLINICAL_PROGRESSION: NOT CHANGED

## 2020-07-31 ASSESSMENT — PAIN DESCRIPTION - PAIN TYPE
TYPE: SURGICAL PAIN

## 2020-07-31 ASSESSMENT — PAIN DESCRIPTION - LOCATION
LOCATION: KNEE

## 2020-07-31 ASSESSMENT — PAIN DESCRIPTION - ORIENTATION
ORIENTATION: RIGHT

## 2020-07-31 ASSESSMENT — PAIN DESCRIPTION - DESCRIPTORS: DESCRIPTORS: ACHING

## 2020-07-31 NOTE — PROGRESS NOTES
OT BEDSIDE TREATMENT NOTE      Date:2020  Patient Name: Harry Coleman  MRN: [id number]  : 1955  Room: [address] [place] [address]     Referring Provider: Cathie Serrano DO   Evaluating OT: Amber Botello OTR/L [id number]     Placement Recommendation: TOO   Recommended Adaptive Equipment: continue to assess     [facility]   AM-PAC Daily Activity Inpatient   How much help for putting on and taking off regular lower body clothing?: Total  How much help for Bathing?: A Lot  How much help for Toileting?: A Lot  How much help for putting on and taking off regular upper body clothing?: A Lot  How much help for taking care of personal grooming?: A Little  How much help for eating meals?: None  AM-PAC Inpatient Daily Activity Raw Score: 14     Diagnosis:   1. Preop testing    2. Status post knee surgery      Pertinent Medical History:   Past Medical History        Past Medical History:   Diagnosis Date    Anesthesia       blood pressure goes low during surgery    Anxiety and depression      Arthritis      Crohn disease (HCC)       IBS    GERD (gastroesophageal reflux disease)      Hyperlipidemia      Hypertension      Hypoglycemia      Nausea & vomiting      Sleep apnea      Thyroid disease       hx/no tx @ present            Precautions:  falls, full weight bearing: R LE d/t TKA   Pain Scale: Numeric Rate: unquantified pain in R knee; Nursing notified. Social history: alone                             Drive: yes, van   Home architecture: Apartment, elevator, tub shower   PLOF: independent with BADL and IADL, ambulated with wheeled walker   Equipment owned: walker, shower chair, tub bench, grab bars, elevated toilet seat, rollator, hand held shower head  Cognition: oriented x 4; follows 3 step directions.                good  Problem solving skills              good  Memory               good  Sequencing  Communication: intact   Visual perceptual skills: intact                Glasses: yes   Edema: no Sensation: intact   Hand Dominance:  Left     X Right       Left Right Comment   Passive range of motion [facility]      Active range of motion [facility]      Muscle Grade 4/5 4/5     /pinch Strength Intact  Intact      Additional Information: Good  and wfl FMC/dexterity noted during ADL tasks Good  and wfl FMC/dexterity noted during ADL tasks        Functional Assessment:    Initial Eval Status  Date: 7/30/2020 Treatment Status  Date: 7/31/2020 STGs = LTGs  Time frame: 5-7 days   Feeding Independent  Independent to bring cup to mouth Independent    Grooming Minimal Assist  N/T Independent    UB Dressing Moderate Assist  Mod A to adjust gown Independent    LB Dressing Dependent  Dep to change socks Independent    Bathing Moderate Assist  Mod A to wash proximal R LE and all of LLE d/t incontinence of urine  Modified [place]    Toileting Dependent   Mod A x 2 for transfer to/from [facility]; increased time; cuing for upright position; max A for hygiene Modified [place]    Bed Mobility  Supine to sit: Supervision   Scooting:Supervision  Sit to supine: Supervision with increased time    Mod A for supine to sit with assist to guide R LE to EOB and UB to sitting; scooting at mod A ; RTB at Min A with pt struggling and using towel under R thigh to assist in transfer; pt declined use of leg ; pt declined therapist assist to RTB; SCD's donned; education on not placing pillow beneath R knee Supine to sit:  Independent   Sit to supine: Independent    Functional Transfers Minimal assist from EOB to wheeled walker with bed height elevated   Mod A x 2 for sit to stand to/from EOB and to/from [facility] with use of FWW; cuing for upright position, weight shifting, sequencing and walker safety Independent    Functional Mobility Minimal assist x 2 with wheeled walker, 4 side steps towards head of bed  Mod A x 2 for 3 ft x 2 to/from [facility] and 3 ft to side step to [facility] with FWW; cuing for sequencing and upright position; assist for walker advancement and weight shifting; Modified [place]    Activity Tolerance Fair   fair minus; pt limited d/t pain and fatigue; nsg aware Good           Comments: Patient cleared by nursing staff. Upon arrival pt supine in bed and states she has to use bathroom. Pt educated with regards to bed mobility, hand placement, safety awareness, static sitting balance,  standing balance, toileting/hygiene, weight shifting, upright position, sequencing, walker navigation/safety,  LE bathing, LE/UE dressing, ECT's. At end of session pt RTB  with all lines and tubes intact, call light within reach. Overall, pt demonstrated decreased independence and safety during completion of ADL/functional transfers/mobility tasks. Pt would benefit from continued skilled OT to increase safety and independence with completion of ADL/IADL tasks for functional independence and quality of life. · Pt has made fair/fair minus progress towards set goals as noted through :  · Pt limited d/t pain and fatigue  · Instruction/training on safety and adapted techniques for  LE dressing/toileting;   · through completion of bed mobility, transfer training, toileting/hygiene.  Min verbal cuing for safety awareness, joint protection; increased time d/t decreased pace and increased pain; nsg aware   · Continue with current plan of care    Treatment Time In: 8:37 AM    Treatment Time Out: 9:31 AM            Treatment Charges: Mins Units   ADL/Home Mgt     27799 25 2   Thera Activities     36711 29 2   Ther Ex                 01600     Manual Therapy    [id number]     Neuro Re-ed         27806     Orthotic manage/training                               92505     Non Billable Time     Total Timed Treatment 54 [id number], [address] [place] [address]

## 2020-07-31 NOTE — CARE COORDINATION
SS Note/Discharge plan:  COVID NEGATIVE 7/24: Met with pt for post-op d/c planning, reviewed therapy evals and rehab options, pt says she does not feel like she can go directly home now after her therapy this morning and request arrangements be made for a temporary TOO placement, pt has Medicaid, will need a LOC prior to transfer, sw offered and reviewed SNF list, pt prefers [facility], referral made to [facility] admissions liaison, awaiting chart review and acceptance. Nursing notified, sw to follow. Electronically signed by MINNIE Castañeda on 7/31/2020 at 9:20 AM

## 2020-07-31 NOTE — PROGRESS NOTES
Department of Orthopedic Surgery  Resident Progress Note    Patient seen and examined. Pt. Complaining of some increased pain, per nursing pt. Refusing oral pain medications and also refusing to get out of bed or move the right leg at all. Major complaint is muscle spasms to the right anterior thigh. Denies any numbness, tingling, paraesthesias. Denies chest pain, SOB, fever, chills. VITALS:  BP (!) 111/52   Pulse 94   Temp 99.1 °F (37.3 °C) (Oral) Comment: RN notified  Resp 18   Ht 5' 7\" (1.702 m)   Wt 235 lb 9 oz (106.9 kg)   SpO2 95%   BMI 36.89 kg/m²     General: alert and oriented    MUSCULOSKELETAL:   right lower extremity:  · Dressing C/D/I  · Compartments soft and compressible  · +PF/DF/EHL  · +2/4 DP & PT pulses, Brisk Cap refill, Toes warm and perfused  · Distal sensation grossly intact to Peroneals, Sural, Saphenous, and tibial nrs    CBC:   Lab Results   Component Value Date    WBC 7.7 07/31/2020    HGB 9.6 07/31/2020    HCT 31.0 07/31/2020     07/31/2020     PT/INR:    Lab Results   Component Value Date    PROTIME 12.1 07/24/2020    INR 1.1 07/24/2020       ASSESSMENT  · S/P R TKA 7/31     PLAN    · Weight bearing as tolerated RLE  · Deep venous thrombosis prophylaxis - eliquis, PCD's, early mobilization  · PT/OT  · Pain Control: IV and PO  · Explained to pt.  Importance of taking pain medications if she is having pain and getting up and moving  · Monitor H&H: 9.6, stable/asymptomatic  · D/C: PT/OT/SW recs, planning  · D/W Dr. Hernan Branham

## 2020-07-31 NOTE — CONSULTS
[address]                                  CONSULTATION    PATIENT NAME: Kori Flores                       :        1955  MED REC NO:   [id number]                            ROOM:       0315  ACCOUNT NO:   [de-identified]                           ADMIT DATE: 2020  PROVIDER:     Rula Gordon DO    CONSULT DATE:  2020    CHIEF COMPLAINT AND HISTORY OF CHIEF COMPLAINT:  This is pleasant  24-year-old white female who was admitted to [address] [place] [address]. The  patient presented to the hospital here under the service of Dr. Kisha Valdivia. The patient underwent an elective right knee replacement  today because of arthritis. The patient apparently has longstanding  symptomatology of osteoarthritis of the right knee. This has been  getting progressively worse, at which time knee replacement was  recommended. She underwent surgery today and apparently is doing well. Postoperative consultation was obtained with Dr. Paul Ferguson and Dr. Denae Barrera. From a cardiac standpoint view, the patient currently denies any chest  pain. She denies any resting or exertional dyspnea, orthopnea,  paroxysmal nocturnal dyspnea, intermittent claudication, or pretibial  edema. She does have known history of hypertension. Respiratory yuen,  she does not smoke. She denies any productive cough, TB, or asthma. Gastrointestinal wise, the patient does occasionally have loose stools. She suspects this could have been due to some of the medications she was  on. She denies any recent change in bowel habits, hematochezia, or  rectal bleeding, weight loss or weight gain. Genitourinary wise, she  does relate to occasional stress incontinency. Neurologically, she  denies any history of stroke, TIAs, etc.  She was currently seen  postoperatively. PAST MEDICAL HISTORY:  Positive for usual childhood diseases.   She does  have ectopy. ABDOMEN:  Obese. EXTREMITIES:  Currently without any cyanosis, clubbing or edema. Right  knee does reveal postoperative changes. It was in Ace bandage wrapped. NEUROLOGIC:  Grossly intact. REVIEW OF CHART: Satisfactory x-ray placement of the knee. ASSESSMENT:  At this time include:  1. Status post right knee arthroplasty with degenerative arthritis. 2.  Obesity secondary to excessive caloric intake with elevated BMI of  36.89.  3.  History of depression, on Paxil. 4.  Essential hypertension, on ACE inhibitor, Zestril. 5.  Hyperlipidemia, on Zocor. 6.  Gastroesophageal reflux disease, on Prevacid. 7.  Urinary incontinency, probably stress and urge related. 8.  Herpes simplex type 1, on suppressive therapy of Zovirax. PLAN AND RECOMMENDATIONS:  At this time currently, the patient does  appear to be stable. She underwent surgery today. Appeared to be doing  satisfactorily. She has been resuming her medication. I will follow  her blood pressure accordingly. DVT prophylaxis has been employed. We  will obtain repeat lab work tomorrow including EKG and cardiac enzymes.         Shannon Torres DO    D: 07/30/2020 18:43:33       T: 07/30/2020 22:57:48     [email or web address]  Job#: [id number]     Doc#: [id number]    CC:

## 2020-07-31 NOTE — PROGRESS NOTES
Nurse to nurse report called at 0 to Montserrat Block from [facility]. All questions were answered at this time.

## 2020-07-31 NOTE — PROGRESS NOTES
Physical Therapy Treatment Note    Room #:  0315/0315-02  Patient Name: Deborah Pierre  YOB: 1955  MRN: [id number]    Referring Provider: Nora Kulkarni DO  Date of Service: 7/31/2020  Evaluating Physical Therapist: Mark Rosa, PT #[id number]      Diagnosis: Arthritis of right knee [M17.11] status post Right Total knee arthroplasty (TKA)    Patient Active Problem List   Diagnosis    [facility]    Hypertension    COPD (chronic obstructive pulmonary disease) ([facility]    Arthritis    Hyperlipidemia    GERD (gastroesophageal reflux disease)    Anxiety and depression    Status post knee surgery    Hypotension    Arthritis of right knee      Tentative placement recommendation: Subacute vs Home Health PT 5 days a week if goals met  Equipment recommendation: Patient has needed equipment       Prior Level of Function: Patient ambulated with rollator    Rehab Potential: good for baseline    Past medical history:  Past Medical History:   Diagnosis Date    Anesthesia     blood pressure goes low during surgery    Anxiety and depression     Arthritis     Crohn disease ([facility]     IBS    GERD (gastroesophageal reflux disease)     Hyperlipidemia     Hypertension     Hypoglycemia     Nausea & vomiting     Sleep apnea     Thyroid disease     hx/no tx @ present     Past Surgical History:   Procedure Laterality Date    BLADDER SURGERY  bladder bx benign and urethra stretched    BLADDER SUSPENSION      cystocele repair also    COLONOSCOPY      ENDOSCOPY, COLON, DIAGNOSTIC      FRACTURE SURGERY      Rt.  Elbow    HYSTERECTOMY      partial    JOINT REPLACEMENT Left     knee    KNEE ARTHROSCOPY  06-04-12    left    KNEE ARTHROSCOPY      left    LITHOTRIPSY  12/20/2013    SKIN BIOPSY      TOTAL KNEE ARTHROPLASTY      Left Knee    TOTAL KNEE ARTHROPLASTY Right 7/30/2020    RIGHT TOTAL KNEE ARTHROPLASTY (DEPUY) performed by Nora Kulkarni DO at [facility] [employer] [facility] ENDOSCOPY         Precautions: falls, alarm and behavior, Right FWB (full weight bearing), don't use left upper extremity due to h/o injury to shoulder and patient sensitive to touch of that area    SUBJECTIVE:    Social history: Patient lives alone in a apartment with Elevator to enter, Tub shower, grab bars    Equipment owned: [employer] Romp, Re[address] and Shower chair,       Jaylon Art Mobility Inpatient   How much difficulty turning over in bed?: A Lot  How much difficulty sitting down on / standing up from a chair with arms?: A Lot  How much difficulty moving from lying on back to sitting on side of bed?: A Lot  How much help from another person moving to and from a bed to a chair?: A Lot  How much help from another person needed to walk in hospital room?: A Lot  How much help from another person for climbing 3-5 steps with a railing?: Total  AM-PAC Inpatient Mobility Raw Score : 11  AM-PAC Inpatient T-Scale Score : 33.86  Mobility Inpatient CMS 0-100% Score: 72.57  Mobility Inpatient CMS G-Code Modifier : CL    Nursing cleared patient for PT treatment. Patient complained of pain in right knee and left upper extremity. Patient was medicated for pain prior to treatment    OBJECTIVE:   Initial Evaluation  Date: 7/30/2020 Treatment Date: 7/31/2020 Short Term/ Long Term   Goals   Was pt agreeable to Eval/treatment? Yes Yes    Pain Level  10/10  Right knee   10/10 right knee     Bed Mobility  Rolling: Minimal assist of 1    Supine to sit: Minimal assist of 1    Sit to supine: Minimal assist of 1    Scooting: Minimal assist of 1   Rolling: Not assessed    Supine to sit: Moderate assist of 1 with consistent cueing needed to complete  Sit to supine: Supervision  with consistent cueing needed to complete; patient did not want to be touched  Scooting: Maximal assist of 1  Rolling: Independent    Supine to sit:  Independent    Sit to supine: Independent    Scooting: Independent     Transfers Sit to stand: Minimal assist of 1 bed elevated Cues for hand placement and safety  Sit to stand: Maximal/ Moderate assist of 1  Sit to stand: Modified Independent     Ambulation    4 feet using  wheeled walker with Minimal assist of 1   cues for right knee extension in stance phase of gait, heel toe walk Left lower extremity  Ambulated 2 feet and sidestepped 3 feet using wheeled walker with Moderate assist of  2. Verbal cues were given for safety, upright posture, increased base of support, walker management, sequencing, and obstacle negotiation. 100 feet using  wheeled walker with Modified Independent    Stair negotiation: ascended and descended   Not assessed    Not assessed        ROM Within functional limits except Right knee 15-70°     Increase range of motion 10% of affected joints    Strength except Rightlower extremity 2+/5  Within functional limits   Balance Sitting EOB:  good     Dynamic Standing:  fair   wheeled walker  Sitting EOB: fair   Dynamic Standing:  poor due to leaning forward onto elbows using wheeled walker Sitting EOB:  good    Dynamic Standing:  good wheeled walker      Patient is Alert & Oriented x person, place, time and situation and follows directions    Patient education  Patient was educated and facilitated on techniques to increase safety and independence with bed mobility, balance, functional transfers, and functional mobility. Patient was explained the the benefits of mobility and risks of immobility. Patient response to education:   Pt verbalized understanding Pt demonstrated skill Pt requires further education in this area    Yes Partial Yes      Treatment: Patient practiced and was instructed in the following treatment:      Patient transferred to side of bed and sat up x 10 minutes to increase dynamic sitting balance and activity tolerance. Patient stood and ambulated to bedside commode. Patient required assist with commode transfers and hygiene.  Patient ambulated towards head of bed and sat on side of bed. Patient performed below exercises. Patient was returned to bed at end of treatment. Therapeutic Exercises: Patient performed AROM ankle pumps x 10 reps. Verbal cues were given for correct technique and to perform 2-3x daily. Patient declined further exercises despite maximal encouragement. At end of session, patient was in bed with alarm activated, call light and phone within reach, all lines were intact, and nursing was notified. ASSESSMENT:  Patient had poor tolerance to above treatment due to pain. Patient required increased time to complete and recover from activity due to slow movement and would become easily agitated and discouraged. Patient did not want this PTA to touch bilateral lower extremities and left upper extremity to assist with bed mobility or exercises. Patient displayed a poor and unsafe technique for sit to supine transfer using a towel wrapped around her thigh to support her right lower extremity and did not want assist or guidance from this PTA. Patient would benefit from continued skilled Physical Therapy to improve functional independence and quality of life. PLAN:    Patient is making poor progress towards established goals, will continue with current plan of care.       Time in: 8:37  Time out: 9:31    Total Treatment Time: 54 minutes    CPT codes:  Therapeutic activities (37807)   27 minutes  2 unit(s)  Gait Training (34147) 27 minutes 2 unit(s)    Ayesha Madrid PTA   LIC# [id number]

## 2020-07-31 NOTE — PROGRESS NOTES
Internal Medicine Progress Note    [facility]    Eduardo Petersen, RADHA.A.CAnayeliOAnayeliIAnayeli Velasco D.O., FELIXOAnayeliITOMASA Hargrove, MSN, APRN, NP-C  Melquiades Burciaga. Du Schuler, MSN, APRN-CNP     Primary Care Physician: Rowan Cuevas DO   Admitting Physician:  Joshua Bee DO  Admission date and time: 7/30/2020  6:08 AM    Room:  [address] [place] [address]  Admitting diagnosis: Arthritis of right knee [M17.11]    Patient Name: Liliana Ortega  MRN: [id number]    Date of Service: 7/31/2020     Subjective:    Radha Gonzalez is a 59 y. o.  female who was seen and examined today,7/31/2020, at the bedside. Patient is postop day #1. She does complain of a lot of pain and discomfort in her right knee. She complained of pain upon straightening the leg. She does feel that the present time she is not able to go home since she lives by herself and does not feel comfortable have adequate care.  looking upon nursing home for temporary rehab. Physical therapy and occupational to evaluate today    No family present during my examination. Review of System:    Constitutional:   Denies fever or chills, weight loss or gain, fatigue or malaise. HEENT:   Denies ear pain, sore throat, sinus or eye problems. Cardiovascular:   Denies any chest pain, irregular heartbeats, or palpitations. Respiratory:   Denies shortness of breath, coughing, sputum production, hemoptysis, or wheezing. Gastrointestinal:   Denies nausea, vomiting, diarrhea, or constipation. Denies any abdominal pain. Genitourinary:    Denies any urgency, frequency, hematuria. Voiding  without difficulty. Extremities:   Denies lower extremity swelling, edema or cyanosis. Right knee in Ace bandage with dressing applied  Neurology:    Denies any headache or focal neurological deficits, Denies generalized weakness or memory difficulty.    Psch:   Denies being anxious or assessed. Skin:    No rashes  Skin normal color and texture.   Genitalia/Breast:  Deferred    Allergy:  Allergies   Allergen Reactions    Aspirin Other (See Comments)     bleeding    Iodine Hives and Swelling     Betadine   Shellfish    Medrol [Methylprednisolone] Anaphylaxis    Pork-Derived Products Swelling     Pig gut sutures    Rice Swelling    Sulfa Antibiotics Hives and Swelling    Tape May Bongo Tape] Rash    Codeine      Gallbladder attack    Darvocet [Propoxyphene N-Acetaminophen]      Crohn's affected    Other      bandaids rash    Plasticized Base [Plastibase]         Medication:  Scheduled Meds:   methocarbamol  1,500 mg Oral 4x Daily    acyclovir  800 mg Oral Daily    lisinopril  10 mg Oral Daily    spironolactone  25 mg Oral BID    potassium chloride  20 mEq Oral Daily with breakfast    glycopyrrolate  2 mg Oral BID    sodium chloride flush  10 mL Intravenous 2 times per day    sennosides-docusate sodium  1 tablet Oral BID    pantoprazole  40 mg Oral QAM AC    hydrOXYzine  25 mg Oral BID    apixaban  2.5 mg Oral BID     Continuous Infusions:   sodium chloride 125 mL/hr at 07/30/20 8645       Objective Data:  CBC:   Recent Labs     07/31/20 0418   WBC 7.7   HGB 9.6*        BMP:    Recent Labs     07/31/20 0418      K 4.1      CO2 24   BUN 10   CREATININE 0.6   GLUCOSE 113*     CMP:    Lab Results   Component Value Date     07/31/2020    K 4.1 07/31/2020    K 4.2 07/24/2020     07/31/2020    CO2 24 07/31/2020    BUN 10 07/31/2020    CREATININE 0.6 07/31/2020    GFRAA >60 07/31/2020    LABGLOM >60 07/31/2020    GLUCOSE 113 07/31/2020    GLUCOSE 85 04/25/2012    PROT 6.3 07/31/2020    LABALBU 3.2 07/31/2020    LABALBU 4.2 04/25/2012    CALCIUM 8.4 07/31/2020    BILITOT 0.5 07/31/2020    ALKPHOS 55 07/31/2020    AST 13 07/31/2020    ALT 10 07/31/2020     Hepatic:   Recent Labs     07/31/20 0418   AST 13   ALT 10   BILITOT 0.5   ALKPHOS 55 Troponin:   Recent Labs     07/31/20 0418   TROPONINI <0.01     BNP: No results for input(s): BNP in the last 72 hours. Lipids:   Recent Labs     07/31/20 0418   CHOL 113   HDL 45     ABGs: No results found for: PHART, PO2ART, NGA7GUX  INR: No results for input(s): INR, PROTIME in the last 72 hours. RAD: Xr Chest (2 Vw)    Result Date: 7/24/2020  EXAMINATION: TWO XRAY VIEWS OF THE CHEST 7/24/2020 12:03 pm COMPARISON: None. HISTORY: ORDERING SYSTEM PROVIDED HISTORY: pre-op, hx of sleep apnea TECHNOLOGIST PROVIDED HISTORY: Reason for exam:->pre-op, hx of sleep apnea FINDINGS: The lungs are without acute focal process. There is no effusion or pneumothorax. The cardiomediastinal silhouette is without acute process. The osseous structures are without acute process. No acute process. Wound Documentation:   Incision 02/20/14 Knee (Active)   Number of days: 6173       Chronic Hospital Medical Problems:  Past Medical History:   Diagnosis Date    Anesthesia     blood pressure goes low during surgery    Anxiety and depression     Arthritis     Crohn disease ([address]     IBS    GERD (gastroesophageal reflux disease)     Hyperlipidemia     Hypertension     Hypoglycemia     Nausea & vomiting     Sleep apnea     Thyroid disease     hx/no tx @ present     Active Problems:    Arthritis of right knee  Resolved Problems:    * No resolved hospital problems. *      Assessment:    · Status post right knee arthroplasty with degenerative arthritis by Dr. Levi Gil on July 30  · Obesity secondary to excessive caloric intake with elevated BMI of 36.89.  · History of depression, on Paxil. · Essential hypertension, on ACE inhibitor, Zestril. · Hyperlipidemia, on Zocor. · Gastroesophageal reflux disease, on Prevacid. · Urinary incontinency, probably stress and urge related. · Herpes simplex type 1, on suppressive therapy of Zovirax.       Plan:     · The patient does have multiple complaints with pain in the right leg and spasm. Do not feel comfortable that she will be able to go home today. Physical occupational therapy are evaluating. Looking into short-term rehab  · Lab work appear to be stable  · Encourage mobility and continue DVT prophylaxis        More than 50% of my  time was spent at the bedside counseling and/or coordination of care with the patient and/or family with face to face contact. This time was spent reviewing notes and laboratory data, instructing and counseling the patient. Time I spent with the family or surrogate(s) is included only if the patient was incapable of providing the necessary information or participating in medical decisionsI also discussed the differential diagnosis and all of the proposed management plans with the patient and individuals accompanying the patient. I reviewed the patient's past medical, surgical history and medication. Please see orders for further plan of care. Reviewed consultant recommendations/notes and/or discussion. Patient's medications were reviewed/continued/adjusted. Labs as ordered. Please see orders for further plan of care. I reviewed the  course of events since last visit. Jaya Coburn DO, F.A.C.O.I.   On 7/31/2020  6:53 AM

## 2020-07-31 NOTE — DISCHARGE INSTR - COC
Continuity of Care Form    Patient Name: Domitila Pinto   :  1955  MRN:  [id number]    Admit date:  2020  Discharge date:  2020    Code Status Order: Full Code   Advance Directives:   Advance Care Flowsheet Documentation     Date/Time Healthcare Directive Type of Healthcare Directive Copy in [address] Agent's Name Healthcare Agent's Phone Number    20 1236  No, patient does not have an advance directive for healthcare treatment -- -- -- -- --          Admitting Physician:  India Gatica DO  PCP: Neeta Morataya DO    Discharging Nurse: [facility] Unit/Room#: 0315/0315-02  Discharging Unit Phone Number: [phone]    Emergency Contact:   Extended Emergency Contact Information  Primary Emergency Contact: Hitesh Phone: [phone]  Relation: Other    Past Surgical History:  Past Surgical History:   Procedure Laterality Date    BLADDER SURGERY  bladder bx benign and urethra stretched    BLADDER SUSPENSION      cystocele repair also    COLONOSCOPY      ENDOSCOPY, COLON, DIAGNOSTIC      FRACTURE SURGERY      Rt. Elbow    HYSTERECTOMY      partial    JOINT REPLACEMENT Left     knee    KNEE ARTHROSCOPY  12    left    KNEE ARTHROSCOPY      left    LITHOTRIPSY  2013    SKIN BIOPSY      TOTAL KNEE ARTHROPLASTY      Left Knee    TOTAL KNEE ARTHROPLASTY Right 2020    RIGHT TOTAL KNEE ARTHROPLASTY (DEPUY) performed by India Gatica DO at [address]         Immunization History: There is no immunization history on file for this patient.     Active Problems:  Patient Active Problem List   Diagnosis Code    Crohn disease ([facility] K50.90    Hypertension I10    COPD (chronic obstructive pulmonary disease) ([facility] J44.9    Arthritis M19.90    Hyperlipidemia E78.5    GERD (gastroesophageal reflux disease) K21.9    Anxiety and depression F41.9, F32.9    Status post knee surgery Z98.890    Hypotension I95.9    Arthritis of right knee M17.11       Isolation/Infection:   Isolation          No Isolation        Patient Infection Status     Infection Onset Added Last Indicated Last Indicated By Review Planned Expiration Resolved Resolved By    None active    Resolved    COVID-19 Rule Out 07/24/20 07/24/20 07/24/20 Covid-19 Ambulatory (Ordered)   07/26/20 Rule-Out Test Resulted          Nurse Assessment:  Last Vital Signs: BP (!) 160/67   Pulse 107   Temp 98.7 °F (37.1 °C) (Oral)   Resp 16   Ht 5' 7\" (1.702 m)   Wt 235 lb 9 oz (106.9 kg)   SpO2 91%   BMI 36.89 kg/m²     Last documented pain score (0-10 scale): Pain Level: 10  Last Weight:   Wt Readings from Last 1 Encounters:   07/30/20 235 lb 9 oz (106.9 kg)     Mental Status:  oriented and alert    IV Access:  - None    Nursing Mobility/ADLs:  Walking   Assisted  Transfer  Assisted  Bathing  Assisted  Dressing  Assisted  Toileting  Assisted  Feeding  Independent  Med Admin  Assisted  Med Delivery   whole    Wound Care Documentation and Therapy:  Incision 02/20/14 Knee (Active)   Number of days: 2353        Elimination:  Continence:   · Bowel: Yes  · Bladder: Yes  Urinary Catheter: None   Colostomy/Ileostomy/Ileal Conduit: No       Date of Last BM: ***    Intake/Output Summary (Last 24 hours) at 7/31/2020 1453  Last data filed at 7/31/2020 1452  Gross per 24 hour   Intake 1350 ml   Output 2600 ml   Net -1250 ml     I/O last 3 completed shifts: In: 3030 [P.O.:130; I.V.:2900]  Out: 1620 [Urine:1600; Blood:20]    Safety Concerns: At Risk for Falls    Impairments/Disabilities:      None    Nutrition Therapy:  Current Nutrition Therapy:   - Oral Diet:  General    Routes of Feeding: Oral  Liquids:  Thin Liquids  Daily Fluid Restriction: no  Last Modified Barium Swallow with Video (Video Swallowing Test): not done    Treatments at the Time of Hospital Discharge:   Respiratory Treatments: ***  Oxygen Therapy:  is not on

## 2020-07-31 NOTE — PROGRESS NOTES
Physical Therapy Treatment Note    Room #:  0315/0315-02  Patient Name: Deborah Pierre  YOB: 1955  MRN: [id number]    Referring Provider: Nora Kulkarni DO  Date of Service: 7/31/2020  Evaluating Physical Therapist: Mark Rosa, PT #[id number]      Diagnosis: Arthritis of right knee [M17.11] status post Right Total knee arthroplasty (TKA)    Patient Active Problem List   Diagnosis    [facility]    Hypertension    COPD (chronic obstructive pulmonary disease) ([facility]    Arthritis    Hyperlipidemia    GERD (gastroesophageal reflux disease)    Anxiety and depression    Status post knee surgery    Hypotension    Arthritis of right knee      Tentative placement recommendation: Subacute vs Home Health PT 5 days a week if goals met  Equipment recommendation: Patient has needed equipment       Prior Level of Function: Patient ambulated with rollator    Rehab Potential: good for baseline    Past medical history:  Past Medical History:   Diagnosis Date    Anesthesia     blood pressure goes low during surgery    Anxiety and depression     Arthritis     Crohn disease ([facility]     IBS    GERD (gastroesophageal reflux disease)     Hyperlipidemia     Hypertension     Hypoglycemia     Nausea & vomiting     Sleep apnea     Thyroid disease     hx/no tx @ present     Past Surgical History:   Procedure Laterality Date    BLADDER SURGERY  bladder bx benign and urethra stretched    BLADDER SUSPENSION      cystocele repair also    COLONOSCOPY      ENDOSCOPY, COLON, DIAGNOSTIC      FRACTURE SURGERY      Rt.  Elbow    HYSTERECTOMY      partial    JOINT REPLACEMENT Left     knee    KNEE ARTHROSCOPY  06-04-12    left    KNEE ARTHROSCOPY      left    LITHOTRIPSY  12/20/2013    SKIN BIOPSY      TOTAL KNEE ARTHROPLASTY      Left Knee    TOTAL KNEE ARTHROPLASTY Right 7/30/2020    RIGHT TOTAL KNEE ARTHROPLASTY (DEPUY) performed by Nora Kulkarni DO at [address] [place] [address] Sit to stand: Not assessed   Sit to stand: Modified Independent     Ambulation    4 feet using  wheeled walker with Minimal assist of 1   cues for right knee extension in stance phase of gait, heel toe walk Left lower extremity  Not assessed 100 feet using  wheeled walker with Modified Independent    Stair negotiation: ascended and descended   Not assessed    Not assessed        ROM Within functional limits except Right knee 15-70°     Increase range of motion 10% of affected joints    Strength except Rightlower extremity 2+/5  Within functional limits   Balance Sitting EOB:  good     Dynamic Standing:  fair   wheeled walker  Sitting EOB: Not assessed  Dynamic Standing:  Not assessed  Sitting EOB:  good    Dynamic Standing:  good wheeled walker      Patient is Alert & Oriented x person, place, time and situation and follows directions    Patient education  Patient was educated and facilitated on techniques to increase safety and independence with bed mobility, balance, functional transfers, and functional mobility. Patient was explained the the benefits of mobility and risks of immobility. Patient response to education:   Pt verbalized understanding Pt demonstrated skill Pt requires further education in this area    Yes Partial Yes      Treatment: Patient practiced and was instructed in the following treatment:      Therapeutic Exercises: Patient performed AAROM/PROM ankle pumps, quad sets, hip abduction/adduction and straight leg raise x 10 reps. Verbal cues were given for correct technique and to perform 2-3x daily. Patient declined further exercises due to pain. At end of session, patient was in bed with alarm activated, call light and phone within reach, all lines were intact, and nursing was notified. ASSESSMENT:  Patient had poor tolerance to above exercises due to pain and required slow, limited motion.     Patient would benefit from continued skilled Physical Therapy to improve functional independence and quality of life. PLAN:    Patient is making poor progress towards established goals, will continue with current plan of care.       Time in: 2:13  Time out: 2:28    Total Treatment Time: 15 minutes    CPT codes:  Therapeutic exercises (95636)   15 minutes  1 unit(s)    Derrek Deleon PTA   LIC# [id number]

## 2020-07-31 NOTE — CARE COORDINATION
SS Note/Discharge plan:  COVID NEGATIVE 7/24: notified by Elmer rogel for [facility] that they will have a bed to admit pt today, HENS completed, D/C Plan Interagency Referral Transfer form and Medicaid LOC request faxed to Pre-Admission Review at [address] [place] [address], [address], nursing notified, sw awaiting ICF LOC determination to confirm transfer arrangements. pt and nursing notified. Electronically signed by MINNIE Sapp on 7/31/2020 at 1:49 PM

## 2020-07-31 NOTE — DISCHARGE SUMMARY
Physician Discharge Summary     Patient ID:  Deborah Pierre  [id number]  59 y.o.  1955    Admit date: 7/30/2020    Discharge date and time: No discharge date for patient encounter. Admitting Physician: Nora Kulkarni DO     Discharge Physician: Nora Kulkarni DO    Admission Diagnoses: Arthritis of right knee [M17.11]    Discharge Diagnoses: s/p right total Knee arthroplasty    Admission Condition: fair    Discharged Condition: good    Hospital Course: The patient was admitted from the pre-operative holding area and underwent an uneventful course of right knee arthroplasty on 7/30/2020 by Nora Kulkarni DO. The patient was subsequently taken to the PACU and to the floor in stable condition. The patient continued antibiotics 24 hours postoperatively, as they received a dose of antibiotics preoperatively for infection prophylaxis. The patient was to begin physical therapy, WBAT, the day of surgery. The patient was also started on DVT prophylaxis. Blood counts were followed daily. On post-op day 2, the dressing was changed, revealing the wound to be benign in appearance without obvious signs of infection including erythema or purulence. The cintron was discontinued. Pain medications were transitioned to oral medication.  was consulted for D/C planning as the patient made appropriate gains with PT in regaining independent function. On POD 1, the patient was discharged to nursing home in stable condition. Treatments: s/p right total Knee arthroplasty    Disposition: The patient was provided instructions to follow up with Nora Kulkarni DO in 2 weeks and to call the office for an appointment. staples (s) were to be removed in 2 weeks. Pt was to continue DVT prophylaxis as directed. Patient was also instructed they may shower on POD 4, and to continue daily dry sterile dressing changes until wound was dry.     Current Discharge Medication List      CONTINUE these medications which have NOT CHANGED    Details   potassium chloride (KLOR-CON M) 20 MEQ TBCR extended release tablet Take 20 mEq by mouth daily (with breakfast)      Lactobacillus (ACIDOPHILUS PO) Take 25 Million Cells by mouth daily 1 or 2      magnesium oxide (MAG-OX) 400 MG tablet Take 400 mg by mouth daily      glycopyrrolate (ROBINUL) 1 MG tablet Take 2 mg by mouth 2 times daily      Melatonin 10 MG TBDP Take by mouth nightly       vitamin B-12 (CYANOCOBALAMIN) 1000 MCG tablet Take 1,000 mcg by mouth daily. vitamin E 400 UNIT capsule Take 400 Units by mouth 2 times daily. Last dose 11/8/2012      Mesalamine (ASACOL HD) 800 MG TBEC Take 1 tablet by mouth 2 times daily. hydrOXYzine (ATARAX) 25 MG tablet Take 25 mg by mouth 2 times daily Takes one tablet in AM, two tablets in the PM      paroxetine (PAXIL) 20 MG tablet Take 25 mg by mouth every morning       acyclovir (ZOVIRAX) 800 MG tablet Take 800 mg by mouth daily. lisinopril (PRINIVIL;ZESTRIL) 10 MG tablet Take 10 mg by mouth daily. Cholecalciferol (VITAMIN D3) 2000 UNITS CAPS Take 1 tablet by mouth daily. simvastatin (ZOCOR) 40 MG tablet Take 40 mg by mouth nightly. lansoprazole (PREVACID SOLUTAB) 30 MG disintegrating tablet Take 30 mg by mouth daily. Nightly       spironolactone (ALDACTONE) 25 MG tablet Take 25 mg by mouth 2 times daily.                Signed:  Luna Giles  7/31/2020  5:23 PM

## 2021-11-22 ENCOUNTER — OFFICE VISIT (OUTPATIENT)
Dept: SURGERY | Age: 66
End: 2021-11-22
Payer: COMMERCIAL

## 2021-11-22 VITALS
HEART RATE: 80 BPM | OXYGEN SATURATION: 98 % | BODY MASS INDEX: 37.67 KG/M2 | RESPIRATION RATE: 18 BRPM | DIASTOLIC BLOOD PRESSURE: 75 MMHG | WEIGHT: 240 LBS | HEIGHT: 67 IN | SYSTOLIC BLOOD PRESSURE: 144 MMHG | TEMPERATURE: 97.4 F

## 2021-11-22 DIAGNOSIS — K21.00 GASTROESOPHAGEAL REFLUX DISEASE WITH ESOPHAGITIS WITHOUT HEMORRHAGE: Primary | ICD-10-CM

## 2021-11-22 DIAGNOSIS — K44.9 PARAESOPHAGEAL HERNIA: ICD-10-CM

## 2021-11-22 PROCEDURE — G8400 PT W/DXA NO RESULTS DOC: HCPCS | Performed by: SURGERY

## 2021-11-22 PROCEDURE — G8417 CALC BMI ABV UP PARAM F/U: HCPCS | Performed by: SURGERY

## 2021-11-22 PROCEDURE — 4040F PNEUMOC VAC/ADMIN/RCVD: CPT | Performed by: SURGERY

## 2021-11-22 PROCEDURE — G8484 FLU IMMUNIZE NO ADMIN: HCPCS | Performed by: SURGERY

## 2021-11-22 PROCEDURE — 1090F PRES/ABSN URINE INCON ASSESS: CPT | Performed by: SURGERY

## 2021-11-22 PROCEDURE — 3017F COLORECTAL CA SCREEN DOC REV: CPT | Performed by: SURGERY

## 2021-11-22 PROCEDURE — 1036F TOBACCO NON-USER: CPT | Performed by: SURGERY

## 2021-11-22 PROCEDURE — 99204 OFFICE O/P NEW MOD 45 MIN: CPT | Performed by: SURGERY

## 2021-11-22 PROCEDURE — G8427 DOCREV CUR MEDS BY ELIG CLIN: HCPCS | Performed by: SURGERY

## 2021-11-22 PROCEDURE — 1123F ACP DISCUSS/DSCN MKR DOCD: CPT | Performed by: SURGERY

## 2021-11-22 RX ORDER — LORATADINE 10 MG/1
10 TABLET ORAL DAILY
COMMUNITY
Start: 2021-10-29

## 2021-11-22 RX ORDER — SODIUM CHLORIDE 0.9 % (FLUSH) 0.9 %
10 SYRINGE (ML) INJECTION EVERY 12 HOURS SCHEDULED
Status: CANCELLED | OUTPATIENT
Start: 2021-11-22

## 2021-11-22 RX ORDER — HYDROXYZINE PAMOATE 25 MG/1
25 CAPSULE ORAL 3 TIMES DAILY PRN
COMMUNITY
Start: 2021-08-24

## 2021-11-22 RX ORDER — SODIUM CHLORIDE 9 MG/ML
INJECTION, SOLUTION INTRAVENOUS CONTINUOUS
Status: CANCELLED | OUTPATIENT
Start: 2021-11-22

## 2021-11-22 RX ORDER — GLYCOPYRROLATE 2 MG/1
2 TABLET ORAL 2 TIMES DAILY
COMMUNITY
Start: 2021-10-25

## 2021-11-22 RX ORDER — ALBUTEROL SULFATE 90 UG/1
2 AEROSOL, METERED RESPIRATORY (INHALATION) EVERY 6 HOURS PRN
COMMUNITY
Start: 2021-10-08

## 2021-11-22 RX ORDER — SODIUM CHLORIDE 0.9 % (FLUSH) 0.9 %
10 SYRINGE (ML) INJECTION PRN
Status: CANCELLED | OUTPATIENT
Start: 2021-11-22

## 2021-11-22 RX ORDER — SODIUM CHLORIDE 9 MG/ML
25 INJECTION, SOLUTION INTRAVENOUS PRN
Status: CANCELLED | OUTPATIENT
Start: 2021-11-22

## 2021-11-22 NOTE — PROGRESS NOTES
General Surgery History and Physical  [facility]    Patient's Name/Date of Birth: Dori Jack / 1955    Date: November 22, 2021     Surgeon: Willie Gray M.D.    PCP: Hortencia Gottlieb DO     Chief Complaint: GERD, HIATAL HERNIA    HPI:   Dori Jack is a 77 y.o. female who presents for evaluation of GERD and symptomatic hiatal hernia. Timing is intermittent with food, radiation to midline and epigastrium, alleviated by npo and started several months ago and severity is 8/10. Has been under medical management for GERD for many months, symptoms have become refractory to maximal medical therapy. Admits early satiety and regurgitation. Denies SOB, fever, chills, nausea, vomiting. Has had endoscopy performed to evaluate for esophagitis more than 5 yrs ago. Patient Active Problem List   Diagnosis    [facility] disease ([facility]    Hypertension    COPD (chronic obstructive pulmonary disease) ([facility]    Arthritis    Hyperlipidemia    GERD (gastroesophageal reflux disease)    Anxiety and depression    Status post knee surgery    Hypotension    Arthritis of right knee       Past Medical History:   Diagnosis Date    Anesthesia     blood pressure goes low during surgery    Anxiety and depression     Arthritis     Crohn disease ([facility]     IBS    GERD (gastroesophageal reflux disease)     Hyperlipidemia     Hypertension     Hypoglycemia     Nausea & vomiting     Sleep apnea     Thyroid disease     hx/no tx @ present       Past Surgical History:   Procedure Laterality Date    BLADDER SURGERY  bladder bx benign and urethra stretched    BLADDER SUSPENSION      cystocele repair also    COLONOSCOPY      ENDOSCOPY, COLON, DIAGNOSTIC      FRACTURE SURGERY      Rt.  Elbow    HYSTERECTOMY      partial    JOINT REPLACEMENT Left     knee    KNEE ARTHROSCOPY  06-04-12    left    KNEE ARTHROSCOPY      left    LITHOTRIPSY  12/20/2013    SKIN BIOPSY      TOTAL KNEE ARTHROPLASTY      Left Year: Not on file   Transportation Needs:     Lack of Transportation (Medical): Not on file    Lack of Transportation (Non-Medical):  Not on file   Physical Activity:     Days of Exercise per Week: Not on file    Minutes of Exercise per Session: Not on file   Stress:     Feeling of Stress : Not on file   Social Connections:     Frequency of Communication with Friends and Family: Not on file    Frequency of Social Gatherings with Friends and Family: Not on file    Attends Latter day Services: Not on file    Active Member of [address] [place] [address] or Organizations: Not on file    Attends Club or Organization Meetings: Not on file    Marital Status: Not on file   Intimate Partner Violence:     Fear of Current or Ex-Partner: Not on file    Emotionally Abused: Not on file    Physically Abused: Not on file    Sexually Abused: Not on file   Housing Stability:     Unable to Pay for Housing in the Last Year: Not on file    Number of [place] in the Last Year: Not on file    Unstable Housing in the Last Year: Not on file           Review of Systems  Review of Systems -  General ROS: negative for - chills, fatigue or malaise  ENT ROS: negative for - hearing change, nasal congestion or nasal discharge  Allergy and Immunology ROS: negative for - hives, itchy/watery eyes or nasal congestion  Hematological and Lymphatic ROS: negative for - blood clots, blood transfusions, bruising or fatigue  Endocrine ROS: negative for - malaise/lethargy, mood swings, palpitations or polydipsia/polyuria  Respiratory ROS: negative for - sputum changes, stridor, tachypnea or wheezing  Cardiovascular ROS: negative for - irregular heartbeat, loss of consciousness, murmur or orthopnea  Gastrointestinal ROS: negative for - constipation, diarrhea, gas/bloating, heartburn or hematemesis  Genito-Urinary ROS: negative for -  genital discharge, genital ulcers or hematuria  Musculoskeletal ROS: negative for - gait disturbance, muscle pain or muscular

## 2021-11-23 ENCOUNTER — TELEPHONE (OUTPATIENT)
Dept: SURGERY | Age: 66
End: 2021-11-23

## 2021-11-23 NOTE — TELEPHONE ENCOUNTER
Per the order of Dr. Dylan Live, patient has been scheduled for EGD and Manometry on 12.6.2021. Patient provided with procedure information during office visit and scheduled for follow up. Patient instructed to please contact our office with any questions. Patient will need medical clearance for HHR. Request for medical clearance will be sent once note is complete. Surgery scheduling form faxed to [facility] surgery scheduling and fax confirmation received. Dr. Dylan Live to enter orders.     Electronically signed by Yulisa Hugo on 11/23/21 at 7:48 AM EST

## 2021-11-23 NOTE — TELEPHONE ENCOUNTER
Prior Authorization Form:      DEMOGRAPHICS:                     Patient Name:  Lulú Loera  Patient :  1955            Insurance:  Payor: MEDICARE / Plan: MEDICARE PART A AND B / Product Type: *No Product type* /   Insurance ID Number:    Payor/Plan Subscr  Sex Relation Sub. Ins. ID Effective Group Num   1. MEDICARE - ME* UZMA DONAHUE 1955 Female Self [id number] 20                                    [address] [address]   2.  BRIANSOANNALISE Montaño Foot 1955 Female Self [id number]                                   [address]         DIAGNOSIS & PROCEDURE:                       Procedure/Operation: EGD and Manometry           CPT Code: 89482    Diagnosis:  Hiatal Hernia    ICD10 Code: K44.9    Location:  [address] [place] [address]    Surgeon:  Ervin Scherer INFORMATION:                          Date: 2021    Time: TBD              Anesthesia:  MAC/TIVA                                                       Status:  Outpatient        Special Comments:         Electronically signed by Pete Martin on 2021 at 7:48 AM

## 2021-11-24 ENCOUNTER — TELEPHONE (OUTPATIENT)
Dept: ENDOSCOPY | Age: 66
End: 2021-11-24

## 2021-11-24 NOTE — TELEPHONE ENCOUNTER
phoned pt to schedule her for esophageal manometry testing.  pts egd is 12/6 at 1:30pm. esophageal manometry 12/6 at 11:30am.

## 2021-12-03 RX ORDER — COVID-19 ANTIGEN TEST
KIT MISCELLANEOUS 2 TIMES DAILY
COMMUNITY

## 2021-12-03 RX ORDER — DEXLANSOPRAZOLE 60 MG/1
60 CAPSULE, DELAYED RELEASE ORAL DAILY
COMMUNITY

## 2021-12-03 NOTE — PROGRESS NOTES
[facility] [facility]                                                                                                                    PRE OP INSTRUCTIONS FOR  Michelle Linda        Date: 12/3/2021    Date of surgery: 12/6/21   Arrival Time: Hospital will call you between 5pm and 7pm with your final arrival time for surgery    1. Do not eat or drink anything after midnight prior to surgery. This includes no water, chewing gum, mints or ice chips. 2. Take the following medications with a small sip of water on the morning of Surgery: none     3. Diabetics may take evening dose of insulin but none after midnight. If you feel symptomatic or low blood sugar morning of surgery drink 1-2 ounces of apple juice only. 4. Aspirin, Ibuprofen, Advil, Naproxen, Vitamin E and other Anti-inflammatory products should be stopped  before surgery  as directed by your physician. Take Tylenol only unless instructed otherwise by your surgeon. 5. Check with your Doctor regarding stopping Plavix, Coumadin, Lovenox, Eliquis, Effient, or other blood thinners. 6. Do not smoke,use illicit drugs and do not drink any alcoholic beverages 24 hours prior to surgery. 7. You may brush your teeth the morning of surgery. DO NOT SWALLOW WATER    8. You MUST make arrangements for a responsible adult to take you home after your surgery. You will not be allowed to leave alone or drive yourself home. It is strongly suggested someone stay with you the first 24 hrs. Your surgery will be cancelled if you do not have a ride home. 9. PEDIATRIC PATIENTS ONLY:  A parent/legal guardian must accompany a child scheduled for surgery and plan to stay at the hospital until the child is discharged. Please do not bring other children with you.     10. Please wear simple, loose fitting clothing to the hospital.  Latricia Lo not bring valuables (money, credit cards, checkbooks, etc.) Do not wear any makeup (including no eye makeup) or nail polish on your fingers or toes. 11. DO NOT wear any jewelry or piercings on day of surgery. All body piercing jewelry must be removed. 12. Shower the night before surgery with _x__Antibacterial soap /SHIVANI WIPES________    13. TOTAL JOINT REPLACEMENT/HYSTERECTOMY PATIENTS ONLY---Remember to bring Blood Bank bracelet to the hospital on the day of surgery. 14. If you have a Living Will and Durable Power of  for Healthcare, please bring in a copy. 15. If appropriate bring crutches, inspirex, WALKER, CANE etc... 12. Notify your Surgeon if you develop any illness between now and surgery time, cough, cold, fever, sore throat, nausea, vomiting, etc.  Please notify your surgeon if you experience dizziness, shortness of breath or blurred vision between now & the time of your surgery. 17. If you have _x__dentures, they will be removed before going to the OR; we will provide you a container. If you wear ___contact lenses or _x__glasses, they will be removed; please bring a case for them. 18. To provide excellent care visitors will be limited to 1 in the room at any given time. 19. Please bring picture ID and insurance card. 20. Sleep apnea patients need to bring CPAP AND SETTINGS to hospital on day of surgery. 21. During flu season no children under the age of 15 are permitted in the hospital for the safety of all patients. 22. Other                  Please call AMBULATORY CARE if you have any further questions.    [address]     [address]

## 2021-12-06 ENCOUNTER — ANESTHESIA (OUTPATIENT)
Dept: ENDOSCOPY | Age: 66
End: 2021-12-06
Payer: COMMERCIAL

## 2021-12-06 ENCOUNTER — ANESTHESIA EVENT (OUTPATIENT)
Dept: ENDOSCOPY | Age: 66
End: 2021-12-06
Payer: COMMERCIAL

## 2021-12-06 ENCOUNTER — HOSPITAL ENCOUNTER (OUTPATIENT)
Age: 66
Setting detail: OUTPATIENT SURGERY
Discharge: HOME | End: 2021-12-06
Attending: INTERNAL MEDICINE | Admitting: INTERNAL MEDICINE
Payer: COMMERCIAL

## 2021-12-06 VITALS
DIASTOLIC BLOOD PRESSURE: 66 MMHG | BODY MASS INDEX: 37.51 KG/M2 | OXYGEN SATURATION: 98 % | TEMPERATURE: 97 F | HEART RATE: 68 BPM | HEIGHT: 67 IN | RESPIRATION RATE: 18 BRPM | WEIGHT: 239 LBS | SYSTOLIC BLOOD PRESSURE: 146 MMHG

## 2021-12-06 VITALS
DIASTOLIC BLOOD PRESSURE: 56 MMHG | OXYGEN SATURATION: 99 % | RESPIRATION RATE: 17 BRPM | SYSTOLIC BLOOD PRESSURE: 123 MMHG

## 2021-12-06 PROCEDURE — 2709999900 HC NON-CHARGEABLE SUPPLY: Performed by: SURGERY

## 2021-12-06 PROCEDURE — 3700000001 HC ADD 15 MINUTES (ANESTHESIA): Performed by: SURGERY

## 2021-12-06 PROCEDURE — 2580000003 HC RX 258: Performed by: SURGERY

## 2021-12-06 PROCEDURE — 88342 IMHCHEM/IMCYTCHM 1ST ANTB: CPT

## 2021-12-06 PROCEDURE — 3609012400 HC EGD TRANSORAL BIOPSY SINGLE/MULTIPLE: Performed by: SURGERY

## 2021-12-06 PROCEDURE — 6360000002 HC RX W HCPCS: Performed by: NURSE ANESTHETIST, CERTIFIED REGISTERED

## 2021-12-06 PROCEDURE — 2580000003 HC RX 258: Performed by: NURSE ANESTHETIST, CERTIFIED REGISTERED

## 2021-12-06 PROCEDURE — 3609015500 HC GASTRIC/DUODENAL MOTILITY &/OR MANOMETRY STUDY: Performed by: INTERNAL MEDICINE

## 2021-12-06 PROCEDURE — 43239 EGD BIOPSY SINGLE/MULTIPLE: CPT | Performed by: SURGERY

## 2021-12-06 PROCEDURE — 7100000010 HC PHASE II RECOVERY - FIRST 15 MIN: Performed by: SURGERY

## 2021-12-06 PROCEDURE — 88305 TISSUE EXAM BY PATHOLOGIST: CPT

## 2021-12-06 PROCEDURE — 3700000000 HC ANESTHESIA ATTENDED CARE: Performed by: SURGERY

## 2021-12-06 PROCEDURE — 7100000011 HC PHASE II RECOVERY - ADDTL 15 MIN: Performed by: SURGERY

## 2021-12-06 PROCEDURE — 6370000000 HC RX 637 (ALT 250 FOR IP): Performed by: INTERNAL MEDICINE

## 2021-12-06 RX ORDER — LIDOCAINE HYDROCHLORIDE 20 MG/ML
JELLY TOPICAL PRN
Status: DISCONTINUED | OUTPATIENT
Start: 2021-12-06 | End: 2021-12-06 | Stop reason: ALTCHOICE

## 2021-12-06 RX ORDER — PROPOFOL 10 MG/ML
INJECTION, EMULSION INTRAVENOUS PRN
Status: DISCONTINUED | OUTPATIENT
Start: 2021-12-06 | End: 2021-12-06 | Stop reason: SDUPTHER

## 2021-12-06 RX ORDER — DIPHENHYDRAMINE HYDROCHLORIDE 50 MG/ML
12.5 INJECTION INTRAMUSCULAR; INTRAVENOUS
Status: DISCONTINUED | OUTPATIENT
Start: 2021-12-06 | End: 2021-12-06 | Stop reason: HOSPADM

## 2021-12-06 RX ORDER — SODIUM CHLORIDE 9 MG/ML
25 INJECTION, SOLUTION INTRAVENOUS PRN
Status: DISCONTINUED | OUTPATIENT
Start: 2021-12-06 | End: 2021-12-06 | Stop reason: HOSPADM

## 2021-12-06 RX ORDER — SODIUM CHLORIDE 9 MG/ML
INJECTION, SOLUTION INTRAVENOUS CONTINUOUS
Status: DISCONTINUED | OUTPATIENT
Start: 2021-12-06 | End: 2021-12-06 | Stop reason: HOSPADM

## 2021-12-06 RX ORDER — FENTANYL CITRATE 50 UG/ML
25 INJECTION, SOLUTION INTRAMUSCULAR; INTRAVENOUS EVERY 5 MIN PRN
Status: DISCONTINUED | OUTPATIENT
Start: 2021-12-06 | End: 2021-12-06 | Stop reason: HOSPADM

## 2021-12-06 RX ORDER — HYDRALAZINE HYDROCHLORIDE 20 MG/ML
5 INJECTION INTRAMUSCULAR; INTRAVENOUS EVERY 10 MIN PRN
Status: DISCONTINUED | OUTPATIENT
Start: 2021-12-06 | End: 2021-12-06 | Stop reason: HOSPADM

## 2021-12-06 RX ORDER — LABETALOL HYDROCHLORIDE 5 MG/ML
5 INJECTION, SOLUTION INTRAVENOUS EVERY 10 MIN PRN
Status: DISCONTINUED | OUTPATIENT
Start: 2021-12-06 | End: 2021-12-06 | Stop reason: HOSPADM

## 2021-12-06 RX ORDER — SODIUM CHLORIDE 0.9 % (FLUSH) 0.9 %
10 SYRINGE (ML) INJECTION PRN
Status: DISCONTINUED | OUTPATIENT
Start: 2021-12-06 | End: 2021-12-06 | Stop reason: HOSPADM

## 2021-12-06 RX ORDER — SODIUM CHLORIDE 9 MG/ML
INJECTION, SOLUTION INTRAVENOUS CONTINUOUS PRN
Status: DISCONTINUED | OUTPATIENT
Start: 2021-12-06 | End: 2021-12-06 | Stop reason: SDUPTHER

## 2021-12-06 RX ORDER — HYDROCODONE BITARTRATE AND ACETAMINOPHEN 5; 325 MG/1; MG/1
1 TABLET ORAL
Status: DISCONTINUED | OUTPATIENT
Start: 2021-12-06 | End: 2021-12-06 | Stop reason: HOSPADM

## 2021-12-06 RX ORDER — PROCHLORPERAZINE EDISYLATE 5 MG/ML
5 INJECTION INTRAMUSCULAR; INTRAVENOUS
Status: DISCONTINUED | OUTPATIENT
Start: 2021-12-06 | End: 2021-12-06 | Stop reason: HOSPADM

## 2021-12-06 RX ORDER — SODIUM CHLORIDE 0.9 % (FLUSH) 0.9 %
10 SYRINGE (ML) INJECTION EVERY 12 HOURS SCHEDULED
Status: DISCONTINUED | OUTPATIENT
Start: 2021-12-06 | End: 2021-12-06 | Stop reason: HOSPADM

## 2021-12-06 RX ORDER — MEPERIDINE HYDROCHLORIDE 25 MG/ML
12.5 INJECTION INTRAMUSCULAR; INTRAVENOUS; SUBCUTANEOUS EVERY 5 MIN PRN
Status: DISCONTINUED | OUTPATIENT
Start: 2021-12-06 | End: 2021-12-06 | Stop reason: HOSPADM

## 2021-12-06 RX ADMIN — PROPOFOL 60 MG: 10 INJECTION, EMULSION INTRAVENOUS at 14:08

## 2021-12-06 RX ADMIN — PROPOFOL 40 MG: 10 INJECTION, EMULSION INTRAVENOUS at 14:10

## 2021-12-06 RX ADMIN — SODIUM CHLORIDE: 9 INJECTION, SOLUTION INTRAVENOUS at 13:36

## 2021-12-06 RX ADMIN — PROPOFOL 60 MG: 10 INJECTION, EMULSION INTRAVENOUS at 14:07

## 2021-12-06 RX ADMIN — PROPOFOL 60 MG: 10 INJECTION, EMULSION INTRAVENOUS at 14:09

## 2021-12-06 RX ADMIN — SODIUM CHLORIDE: 9 INJECTION, SOLUTION INTRAVENOUS at 13:41

## 2021-12-06 ASSESSMENT — ENCOUNTER SYMPTOMS: DYSPNEA ACTIVITY LEVEL: NO INTERVAL CHANGE

## 2021-12-06 ASSESSMENT — COPD QUESTIONNAIRES: CAT_SEVERITY: NO INTERVAL CHANGE

## 2021-12-06 ASSESSMENT — PAIN - FUNCTIONAL ASSESSMENT: PAIN_FUNCTIONAL_ASSESSMENT: 0-10

## 2021-12-06 ASSESSMENT — LIFESTYLE VARIABLES: SMOKING_STATUS: 0

## 2021-12-06 NOTE — ANESTHESIA POSTPROCEDURE EVALUATION
Department of Anesthesiology  Postprocedure Note    Patient: Lulú Loera  MRN: [id number]  YOB: 1955  Date of evaluation: 12/6/2021  Time:  2:45 PM     Procedure Summary     Date: 12/06/21 Room / Location: [address] [place] [address] / [facility]    Anesthesia Start: 3243 Anesthesia Stop: 2868    Procedure: EGD BIOPSY (N/A ) Diagnosis: (HIATAL HERNIA)    Surgeons: Liliana Garza MD Responsible Provider: Jeison Franco DO    Anesthesia Type: MAC ASA Status: 3          Anesthesia Type: MAC    Dennis Phase I: Dennis Score: 10    Dennis Phase II:      Last vitals: Reviewed and per EMR flowsheets.        Anesthesia Post Evaluation    Patient location during evaluation: bedside  Patient participation: complete - patient participated  Level of consciousness: awake  Pain score: 3  Airway patency: patent  Nausea & Vomiting: no vomiting and no nausea  Complications: no  Cardiovascular status: hemodynamically stable  Respiratory status: acceptable  Hydration status: stable

## 2021-12-06 NOTE — ANESTHESIA PRE PROCEDURE
Department of Anesthesiology  Preprocedure Note       Name:  Nargis Almanzar   Age:  77 y.o.  :  1955                                          MRN:  [id number]         Date:  2021      Surgeon: Quinten Query):  Lloyd Brooks MD    Procedure: Procedure(s):  EGD ESOPHAGOGASTRODUODENOSCOPY    Medications prior to admission:   Prior to Admission medications    Medication Sig Start Date End Date Taking? Authorizing Provider   Naproxen Sodium (ALEVE) 220 MG CAPS Take by mouth 2 times daily   Yes Historical Provider, MD   dexlansoprazole (DEXILANT) 60 MG CPDR delayed release capsule Take 60 mg by mouth daily   Yes Historical Provider, MD   albuterol sulfate  (90 Base) MCG/ACT inhaler Inhale 2 puffs into the lungs every 6 hours as needed  10/8/21  Yes Historical Provider, MD   glycopyrrolate (ROBINUL) 2 MG tablet Take 2 mg by mouth 2 times daily  10/25/21  Yes Historical Provider, MD   hydrOXYzine (VISTARIL) 25 MG capsule Take 25 mg by mouth 3 times daily as needed  21  Yes Historical Provider, MD   loratadine (CLARITIN) 10 MG tablet Take 10 mg by mouth daily  10/29/21  Yes Historical Provider, MD   potassium chloride (KLOR-CON M) 20 MEQ TBCR extended release tablet Take 20 mEq by mouth daily (with breakfast)   Yes Historical Provider, MD   Lactobacillus (ACIDOPHILUS PO) Take 25 Million Cells by mouth daily 1 or 2   Yes Historical Provider, MD   magnesium oxide (MAG-OX) 400 MG tablet Take 400 mg by mouth daily   Yes Historical Provider, MD   Melatonin 10 MG TBDP Take by mouth every other day    Yes Historical Provider, MD   vitamin B-12 (CYANOCOBALAMIN) 1000 MCG tablet Take 1,000 mcg by mouth daily. Yes Historical Provider, MD   vitamin E 400 UNIT capsule Take 400 Units by mouth 2 times daily. Last dose 2012   Yes Historical Provider, MD   Mesalamine (ASACOL HD) 800 MG TBEC Take 1 tablet by mouth 2 times daily.      Yes Historical Provider, MD   hydrOXYzine (ATARAX) 25 MG tablet Take 25 mg by mouth 2 times daily Takes one tablet in AM, two tablets in the PM   Yes Historical Provider, MD   paroxetine (PAXIL) 20 MG tablet Take 25 mg by mouth every morning    Yes Historical Provider, MD   acyclovir (ZOVIRAX) 800 MG tablet Take 800 mg by mouth daily. Yes Historical Provider, MD   lisinopril (PRINIVIL;ZESTRIL) 10 MG tablet Take 10 mg by mouth daily. Yes Historical Provider, MD   Cholecalciferol (VITAMIN D3) 2000 UNITS CAPS Take 1 tablet by mouth daily. Yes Historical Provider, MD   simvastatin (ZOCOR) 40 MG tablet Take 40 mg by mouth Daily    Yes Historical Provider, MD   spironolactone (ALDACTONE) 25 MG tablet Take 25 mg by mouth 2 times daily. Yes Historical Provider, MD       Current medications:    No current facility-administered medications for this encounter. Current Outpatient Medications   Medication Sig Dispense Refill    Naproxen Sodium (ALEVE) 220 MG CAPS Take by mouth 2 times daily      dexlansoprazole (DEXILANT) 60 MG CPDR delayed release capsule Take 60 mg by mouth daily      albuterol sulfate  (90 Base) MCG/ACT inhaler Inhale 2 puffs into the lungs every 6 hours as needed       glycopyrrolate (ROBINUL) 2 MG tablet Take 2 mg by mouth 2 times daily       hydrOXYzine (VISTARIL) 25 MG capsule Take 25 mg by mouth 3 times daily as needed       loratadine (CLARITIN) 10 MG tablet Take 10 mg by mouth daily       potassium chloride (KLOR-CON M) 20 MEQ TBCR extended release tablet Take 20 mEq by mouth daily (with breakfast)      Lactobacillus (ACIDOPHILUS PO) Take 25 Million Cells by mouth daily 1 or 2      magnesium oxide (MAG-OX) 400 MG tablet Take 400 mg by mouth daily      Melatonin 10 MG TBDP Take by mouth every other day       vitamin B-12 (CYANOCOBALAMIN) 1000 MCG tablet Take 1,000 mcg by mouth daily.  vitamin E 400 UNIT capsule Take 400 Units by mouth 2 times daily.  Last dose 11/8/2012      Mesalamine (ASACOL HD) 800 MG TBEC Take 1 tablet by mouth 2 times daily.        hydrOXYzine (ATARAX) 25 MG tablet Take 25 mg by mouth 2 times daily Takes one tablet in AM, two tablets in the PM      paroxetine (PAXIL) 20 MG tablet Take 25 mg by mouth every morning       acyclovir (ZOVIRAX) 800 MG tablet Take 800 mg by mouth daily.  lisinopril (PRINIVIL;ZESTRIL) 10 MG tablet Take 10 mg by mouth daily.  Cholecalciferol (VITAMIN D3) 2000 UNITS CAPS Take 1 tablet by mouth daily.  simvastatin (ZOCOR) 40 MG tablet Take 40 mg by mouth Daily       spironolactone (ALDACTONE) 25 MG tablet Take 25 mg by mouth 2 times daily. Allergies: Allergies   Allergen Reactions    Aspirin Other (See Comments)     bleeding    Iodine Hives and Swelling     Betadine   Shellfish    Medrol [Methylprednisolone] Anaphylaxis    Pork-Derived Products Swelling     Pig gut sutures    Rice Swelling    Sulfa Antibiotics Hives and Swelling    Tape [place] Tape] Rash    Codeine      Gallbladder attack    Darvocet [Propoxyphene N-Acetaminophen]      Crohn's affected    Other      bandaids rash    Plasticized Base [Plastibase]        Problem List:    Patient Active Problem List   Diagnosis Code    Crohn disease ([facility] K50.90    Hypertension I10    COPD (chronic obstructive pulmonary disease) ([facility]) J44.9    Arthritis M19.90    Hyperlipidemia E78.5    GERD (gastroesophageal reflux disease) K21.9    Anxiety and depression F41.9, F32. A    Status post knee surgery Z98.890    Hypotension I95.9    Arthritis of right knee M17.11       Past Medical History:        Diagnosis Date    Anesthesia     blood pressure goes low during surgery    Anxiety and depression     Arthritis     [facility]     IBS    GERD (gastroesophageal reflux disease)     Hyperlipidemia     Hypertension     Hypoglycemia     Nausea & vomiting     PONV (postoperative nausea and vomiting)     Sleep apnea     Thyroid disease     hx/no tx @ present       Past Surgical History: Procedure Laterality Date    BLADDER SURGERY  bladder bx benign and urethra stretched    BLADDER SUSPENSION      cystocele repair also    COLONOSCOPY      ENDOSCOPY, COLON, DIAGNOSTIC      FRACTURE SURGERY      Rt. Elbow    HYSTERECTOMY      partial    JOINT REPLACEMENT Left     knee    KNEE ARTHROSCOPY  06-04-12    left    KNEE ARTHROSCOPY      left    LITHOTRIPSY  12/20/2013    SKIN BIOPSY      TOTAL KNEE ARTHROPLASTY      Left Knee    TOTAL KNEE ARTHROPLASTY Right 7/30/2020    RIGHT TOTAL KNEE ARTHROPLASTY (DEPUY) performed by Chuyita Guillaume DO at [address]         Social History:    Social History     Tobacco Use    Smoking status: Never Smoker    Smokeless tobacco: Never Used   Substance Use Topics    Alcohol use: No                                Counseling given: Not Answered      Vital Signs (Current): There were no vitals filed for this visit.                                            BP Readings from Last 3 Encounters:   11/22/21 (!) 144/75   07/31/20 (!) 142/68   07/30/20 (!) 150/73       NPO Status:                                                                                 BMI:   Wt Readings from Last 3 Encounters:   11/22/21 240 lb (108.9 kg)   07/30/20 235 lb 9 oz (106.9 kg)   07/24/20 233 lb 9 oz (105.9 kg)     There is no height or weight on file to calculate BMI.    CBC:   Lab Results   Component Value Date    WBC 7.7 07/31/2020    RBC 3.34 07/31/2020    HGB 9.6 07/31/2020    HCT 31.0 07/31/2020    MCV 92.8 07/31/2020    RDW 14.1 07/31/2020     07/31/2020       CMP:   Lab Results   Component Value Date     07/31/2020    K 4.1 07/31/2020    K 4.2 07/24/2020     07/31/2020    CO2 24 07/31/2020    BUN 10 07/31/2020    CREATININE 0.6 07/31/2020    GFRAA >60 07/31/2020    LABGLOM >60 07/31/2020    GLUCOSE 113 07/31/2020    GLUCOSE 85 04/25/2012    PROT 6.3 07/31/2020    CALCIUM 8.4 07/31/2020    BILITOT 0.5 07/31/2020 ALKPHOS 55 07/31/2020    AST 13 07/31/2020    ALT 10 07/31/2020       POC Tests: No results for input(s): POCGLU, POCNA, POCK, POCCL, POCBUN, POCHEMO, POCHCT in the last 72 hours. Coags:   Lab Results   Component Value Date    PROTIME 12.1 07/24/2020    INR 1.1 07/24/2020    APTT 33.1 07/24/2020       HCG (If Applicable): No results found for: PREGTESTUR, PREGSERUM, HCG, HCGQUANT     ABGs: No results found for: PHART, PO2ART, ROA1GYN, ZVF9TAD, BEART, F8CTTYRY     Type & Screen (If Applicable):  No results found for: LABABO, LABRH    Drug/Infectious Status (If Applicable):  No results found for: HIV, HEPCAB    COVID-19 Screening (If Applicable):   Lab Results   Component Value Date    COVID19 Not Detected 07/24/2020           Anesthesia Evaluation  Patient summary reviewed and Nursing notes reviewed   history of anesthetic complications: PONV. Airway: Mallampati: III  TM distance: >3 FB   Neck ROM: full  Mouth opening: > = 3 FB Dental:          Pulmonary:   (+) COPD (Oxygen saturation 95% on RA - noncompliant with nebulizer therapy): no interval change,  sleep apnea: on CPAP,  decreased breath sounds,      (-) not a current smoker                          ROS comment: Seasonal allergies   Cardiovascular:  Exercise tolerance: good (>4 METS),   (+) hypertension: mild, MCMAHON: no interval change, murmur (Grade I), hyperlipidemia      ECG reviewed  Rhythm: irregular  Rate: normal           Beta Blocker:  Not on Beta Blocker      ROS comment: Normal sinus rhythm  Normal ekg  No previous ECGs available    PE comment: Sinus arrhythmia   Neuro/Psych:   (+) psychiatric history:depression/anxiety             GI/Hepatic/Renal:   (+) GERD: no interval change,          ROS comment: Crohn's disease. Endo/Other:    (+) blood dyscrasia (9.6/31.0): anemia, arthritis: OA., . Pt had no PAT visit       Abdominal:   (+) obese,           Vascular: negative vascular ROS.          Other Findings:           Anesthesia Plan      MAC     ASA 3       Induction: intravenous. Anesthetic plan and risks discussed with patient. Plan discussed with CRNA.                   Ileana Cortez DO   12/6/2021

## 2021-12-06 NOTE — PROGRESS NOTES
After confirmation of potential allergies, a topical analgesic was used to numb the nares followed by trans-nasal insertion of a high resolution Manometry catheter. Catheter was placed at 52cm. Pressure bands of both UES and LES were observed on the contour color. Patient instructed to take a deep breath to verify placement of catheter and diaphragmatic pinch noted on inspiration. Patient was assisted to semi-supine position and catheter was stabilized with tape. Patient encouraged to relax while acclimating to catheter for several minutes. A 30 second baseline pressure was obtained to identify landmarks. A series of wet swallows with 5 cc of room temperature saline were then administered to assess esophageal motility. At the conclusion of the procedure, the catheter was removed.

## 2021-12-06 NOTE — H&P
General Surgery History and Physical  [facility]    Patient's Name/Date of Birth: Tequila Melendez / 1955    Date: December 6, 2021     Surgeon: Shasta Raza M.D.    PCP: Leti Arias DO     Chief Complaint: GERD, HIATAL HERNIA    HPI:   Tequila Melendez is a 77 y.o. female who presents for evaluation of GERD and symptomatic hiatal hernia. Timing is intermittent with food, radiation to midline and epigastrium, alleviated by npo and started several months ago and severity is 8/10. Has been under medical management for GERD for many months, symptoms have become refractory to maximal medical therapy. Admits early satiety and regurgitation. Denies SOB, fever, chills, nausea, vomiting. Has had endoscopy performed to evaluate for esophagitis more than 5 yrs ago. Patient Active Problem List   Diagnosis    Crohn disease ([address]    Hypertension    COPD (chronic obstructive pulmonary disease) ([address]    Arthritis    Hyperlipidemia    GERD (gastroesophageal reflux disease)    Anxiety and depression    Status post knee surgery    Hypotension    Arthritis of right knee       Past Medical History:   Diagnosis Date    Anesthesia     blood pressure goes low during surgery    Anxiety and depression     Arthritis     Crohn disease ([address]     IBS    GERD (gastroesophageal reflux disease)     Hyperlipidemia     Hypertension     Hypoglycemia     Nausea & vomiting     PONV (postoperative nausea and vomiting)     Sleep apnea     Thyroid disease     hx/no tx @ present       Past Surgical History:   Procedure Laterality Date    BLADDER SURGERY  bladder bx benign and urethra stretched    BLADDER SUSPENSION      cystocele repair also    COLONOSCOPY      ENDOSCOPY, COLON, DIAGNOSTIC      FRACTURE SURGERY      Rt.  Elbow    HYSTERECTOMY      partial    JOINT REPLACEMENT Left     knee    KNEE ARTHROSCOPY  06-04-12    left    KNEE ARTHROSCOPY      left    LITHOTRIPSY  12/20/2013    SKIN BIOPSY      TOTAL KNEE ARTHROPLASTY      Left Knee    TOTAL KNEE ARTHROPLASTY Right 7/30/2020    RIGHT TOTAL KNEE ARTHROPLASTY (DEPUY) performed by Cyrus [place] DO at [address] ENDOSCOPY         Allergies   Allergen Reactions    Aspirin Other (See Comments)     bleeding    Iodine Hives and Swelling     Betadine   Shellfish    Medrol [Methylprednisolone] Anaphylaxis    Pork-Derived Products Swelling     Pig gut sutures    Rice Swelling    Sulfa Antibiotics Hives and Swelling    Tape [Adhesive Tape] Rash    Codeine      Gallbladder attack    Darvocet [Propoxyphene N-Acetaminophen]      Crohn's affected    Other      bandaids rash    Plasticized Base [Plastibase]        The patient has a family history that is negative for severe cardiovascular or respiratory issues, negative for reaction to anesthesia. Time spent reviewing past medical, surgical, social and family history, vitals, nursing assessment and images. No changes from above documented history.     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   Tobacco Use    Smoking status: Never Smoker    Smokeless tobacco: Never Used   Vaping Use    Vaping Use: Never used   Substance and Sexual Activity    Alcohol use: No    Drug use: No    Sexual activity: Not Currently     Partners: Male   Other Topics Concern    Not on file   Social History Narrative     twice    Estranged from her [place] Burner resides in [facility]; Other son is BiPolar gave him up at age 15 to the state     twice     Social Determinants of Health     Financial Resource Strain:     Difficulty of Paying Living Expenses: Not on file   Food Insecurity:     Worried About [address] in the Last Year: Not on file    Ran Out of Food in the Last Year: Not on file   Transportation Needs:     Lack of Transportation (Medical): Not on file    Lack of Transportation (Non-Medical):  Not on file   Physical Activity:     Days of Exercise per Week: Not on file    Minutes of Exercise per Session: Not on file   Stress:     Feeling of Stress : Not on file   Social Connections:     Frequency of Communication with Friends and Family: Not on file    Frequency of Social Gatherings with Friends and Family: Not on file    Attends Faith Services: Not on file    Active Member of [address] [place] [address] or Organizations: Not on file    Attends Club or Organization Meetings: Not on file    Marital Status: Not on file   Intimate Partner Violence:     Fear of Current or Ex-Partner: Not on file    Emotionally Abused: Not on file    Physically Abused: Not on file    Sexually Abused: Not on file   Housing Stability:     Unable to Pay for Housing in the Last Year: Not on file    Number of [place] in the Last Year: Not on file    Unstable Housing in the Last Year: Not on file           Review of Systems  Review of Systems -  General ROS: negative for - chills, fatigue or malaise  ENT ROS: negative for - hearing change, nasal congestion or nasal discharge  Allergy and Immunology ROS: negative for - hives, itchy/watery eyes or nasal congestion  Hematological and Lymphatic ROS: negative for - blood clots, blood transfusions, bruising or fatigue  Endocrine ROS: negative for - malaise/lethargy, mood swings, palpitations or polydipsia/polyuria  Respiratory ROS: negative for - sputum changes, stridor, tachypnea or wheezing  Cardiovascular ROS: negative for - irregular heartbeat, loss of consciousness, murmur or orthopnea  Gastrointestinal ROS: negative for - constipation, diarrhea, gas/bloating, heartburn or hematemesis  Genito-Urinary ROS: negative for -  genital discharge, genital ulcers or hematuria  Musculoskeletal ROS: negative for - gait disturbance, muscle pain or muscular weakness  Psych/Soc ROS: Neg suicidal ideation or visual/auditory hallucinations    Physical exam:   There were no vitals taken for this visit. General appearance:  NAD  Head: NCAT, PERRLA, EOMI, red conjunctiva  Neck: supple, no masses  Lungs: CTAB, equal chest rise bilateral  Heart: Reg rate  Abdomen: soft, nontender, nondistended  Rectal: No bleeding  Skin; no lesions  Gu: no cva tenderness  Extremities: no edema  Psychosocial: No auditory or visual hallucinations      Radiology:     Manometry: Pending  PH testing: Pending  Last EGD pathology    Assessment:  Dionicio Rizzo is a 77 y.o. female with symptomatic paraesophageal hernia, medically refractory GERD  Patient Active Problem List   Diagnosis    [facility] disease ([facility]    Hypertension    COPD (chronic obstructive pulmonary disease) (HCC)    Arthritis    Hyperlipidemia    GERD (gastroesophageal reflux disease)    Anxiety and depression    Status post knee surgery    Hypotension    Arthritis of right knee         Plan:  EGD with pH and manometry  Medical clearance  Pending findings would proceed to OR with lap poss robot assisted paraesophageal hernia repair with fundoplication, myofascial flap and poss gastropexy  Discussed the risk, benefits and alternatives of surgery including wound infections, bleeding, scar, recurrent hernia formation, dysphagia, inability to belch or vomit, stricture and need for dilation and the risks of general anesthetic including MI, CVA, sudden death or reactions to anesthetic medications. The patient understands the risks and alternatives and the possibility of converting to an open procedure. All questions were answered to the patient's satisfaction and they freely signed the consent.              Kennedy Severin, MD  11:15 AM  12/6/2021

## 2021-12-06 NOTE — OP NOTE
[address] [place] [address] [facility]           Operative Report    DATE OF PROCEDURE: 12/6/2021    Mirta Free    SURGEON: Flory Pappas MD.     ASSISTANT: none    PREOPERATIVE DIAGNOSES:  GERD    POSTOPERATIVE DIAGNOSES:   (1) Small sliding Hiatal Hernia  (2) Grade A Esophagitis  (3) Moderate Gastritis    OPERATION: [id number] with antral biopsies    ANESTHESIA: LMAC    BLOOD LOSS: None    COMPLICATIONS: None. History and consent: This is a 77y.o. year old female who is having GERD. I have discussed with the patient the indication, alternatives, and the possible risks and/or complications of upper endoscopy and the conscious sedation anesthesia. The patient and/or family understands and agrees to proceed. Monitoring and Safety:    The patient was placed on a cardiac monitor and vital signs, pulse oximetry and level of consciousness were continuously evaluated throughout the procedure. The patient was closely monitored until recovery from the medications was complete and the patient had returned to baseline status. Anesthesia was present at all times during the procedure. OPERATIONS: The patient was placed on the table and sedated while blood pressure, pulse and pulse oximetry were continuously monitored by the anesthesia team. A bite block was placed prior to sedation and the patient was placed in the left lateral decubitus position. A lubricated scope was easily passed into the upper esophagus which looked normal. The distal esophagus looked normal. The scope was passed into the stomach and retroflexed. The gastroesophageal junction was at 40cm. There was small sliding hiatal hernia. The scope was passed down toward the pylorus. The antral mucosa looked abnormal: gastritis. Biopsy was taken to check for H. pylori.  The scope was then passed through the pylorus into the duodenal bulb which looked normal, then around to the distal duodenum which looked normal, and the scope was then withdrawn. The patient tolerated the procedure well.        Diet: GERD    PLAN:  (1) await manometry    Further Procedures:  Pending above proceed with fundoplication    Physician Signature: Electronically signed by Dr. Alvaro Munoz

## 2021-12-07 ENCOUNTER — TELEPHONE (OUTPATIENT)
Dept: SURGERY | Age: 66
End: 2021-12-07

## 2021-12-07 DIAGNOSIS — K44.9 PARAESOPHAGEAL HERNIA: ICD-10-CM

## 2021-12-07 DIAGNOSIS — K21.00 GASTROESOPHAGEAL REFLUX DISEASE WITH ESOPHAGITIS WITHOUT HEMORRHAGE: ICD-10-CM

## 2021-12-07 NOTE — TELEPHONE ENCOUNTER
Due to a change of insurance coming up, patient has requested to schedule surgery before results appointment to reserve date. Per Dr. Majo Egan, patient is scheduled for Laparoscopic robotic paraesophageal hernia repair  at [address] [place] [address] on 12/30/21. Surgery scheduling form faxed with confirmation, surgeon's calendar updated. Dr. Majo Egan to enter orders. Medical clearance requested from Dr. Fer Burnham. Patient instructed to call office to schedule pre-op appointment. Surgery and diet information to be provided during office visit. Electronically signed by Nadege Dubois RN on 12/7/2021 at 12:13 PM    Attempted to reach Dr. Menjivar Hearing office to determine which tests needed during PAT for medical clearance. No answer at office, will try again.    Electronically signed by Nadege Dubois RN on 12/23/2021 at 8:16 AM

## 2021-12-07 NOTE — TELEPHONE ENCOUNTER
Prior Authorization Form:      DEMOGRAPHICS:                     Patient Name:  Sammy Horton  Patient :  1955            Insurance:  Payor: Judith Rothman / Plan: Corey Gum / Product Type: *No Product type* /   Insurance ID Number:    Payor/Plan Subscr  Sex Relation Sub.  Ins. ID Effective Group Num   1. BRIANSOANNALISE MY* UZMA DONAHUE 1955 Female Self [id number]                                   [address]         DIAGNOSIS & PROCEDURE:                       Procedure/Operation: Laparoscopic robotic paraesophageal hernia repair            CPT Code: 35357    Diagnosis:  Hiatal hernia, GERD     ICD10 Code: K44.9 + K21.00    Location:  [address] [place] [address]    Surgeon:  Nixon Mandel INFORMATION:                          Date: 21    Time: TBD              Anesthesia:  General                                                       Status:  Outpatient        Special Comments:         Electronically signed by Matthew Ramirez RN on 2021 at 12:13 PM

## 2021-12-22 ENCOUNTER — OFFICE VISIT (OUTPATIENT)
Dept: SURGERY | Age: 66
End: 2021-12-22
Payer: COMMERCIAL

## 2021-12-22 VITALS
TEMPERATURE: 97.8 F | BODY MASS INDEX: 37.51 KG/M2 | HEART RATE: 88 BPM | SYSTOLIC BLOOD PRESSURE: 131 MMHG | HEIGHT: 67 IN | DIASTOLIC BLOOD PRESSURE: 66 MMHG | WEIGHT: 239 LBS

## 2021-12-22 DIAGNOSIS — K21.00 GASTROESOPHAGEAL REFLUX DISEASE WITH ESOPHAGITIS WITHOUT HEMORRHAGE: Primary | ICD-10-CM

## 2021-12-22 DIAGNOSIS — K44.9 PARAESOPHAGEAL HERNIA: ICD-10-CM

## 2021-12-22 PROCEDURE — G8417 CALC BMI ABV UP PARAM F/U: HCPCS | Performed by: SURGERY

## 2021-12-22 PROCEDURE — G8427 DOCREV CUR MEDS BY ELIG CLIN: HCPCS | Performed by: SURGERY

## 2021-12-22 PROCEDURE — 3017F COLORECTAL CA SCREEN DOC REV: CPT | Performed by: SURGERY

## 2021-12-22 PROCEDURE — 99213 OFFICE O/P EST LOW 20 MIN: CPT | Performed by: SURGERY

## 2021-12-22 PROCEDURE — 1036F TOBACCO NON-USER: CPT | Performed by: SURGERY

## 2021-12-22 PROCEDURE — 1123F ACP DISCUSS/DSCN MKR DOCD: CPT | Performed by: SURGERY

## 2021-12-22 PROCEDURE — G8484 FLU IMMUNIZE NO ADMIN: HCPCS | Performed by: SURGERY

## 2021-12-22 PROCEDURE — 1090F PRES/ABSN URINE INCON ASSESS: CPT | Performed by: SURGERY

## 2021-12-22 PROCEDURE — 4040F PNEUMOC VAC/ADMIN/RCVD: CPT | Performed by: SURGERY

## 2021-12-22 PROCEDURE — G8400 PT W/DXA NO RESULTS DOC: HCPCS | Performed by: SURGERY

## 2021-12-22 RX ORDER — LANOLIN ALCOHOL/MO/W.PET/CERES
400 CREAM (GRAM) TOPICAL 2 TIMES DAILY
COMMUNITY
Start: 2021-11-23

## 2021-12-22 NOTE — PROGRESS NOTES
General Surgery History and Physical  [facility]    Patient's Name/Date of Birth: Mirta Shelton / 1955    Date: December 22, 2021     Surgeon: Flory Pappas M.D.    PCP: Mar Hernandez DO     Chief Complaint: GERD, HIATAL HERNIA    HPI:   Mirta Shelton is a 77 y.o. female who presents for evaluation of GERD and symptomatic hiatal hernia. Timing is intermittent with food, radiation to midline and epigastrium, alleviated by npo and started several months ago and severity is 8/10. Has been under medical management for GERD for many months, symptoms have become refractory to maximal medical therapy. Admits early satiety and regurgitation. Denies SOB, fever, chills, nausea, vomiting. Has had endoscopy performed to evaluate for esophagitis more than 5 yrs ago. Returns after Cristiana. Normal esophageal motility without achalasia.      Patient Active Problem List   Diagnosis    Crohn disease ([address]    Hypertension    COPD (chronic obstructive pulmonary disease) ([address]    Arthritis    Hyperlipidemia    GERD (gastroesophageal reflux disease)    Anxiety and depression    Status post knee surgery    Hypotension    Arthritis of right knee       Past Medical History:   Diagnosis Date    Anesthesia     blood pressure goes low during surgery    Anxiety and depression     Arthritis     Crohn disease ([address]     IBS    GERD (gastroesophageal reflux disease)     Hyperlipidemia     Hypertension     Hypoglycemia     Nausea & vomiting     PONV (postoperative nausea and vomiting)     Sleep apnea     Thyroid disease     hx/no tx @ present       Past Surgical History:   Procedure Laterality Date    BLADDER SURGERY  bladder bx benign and urethra stretched    BLADDER SUSPENSION      cystocele repair also    COLONOSCOPY      ENDOSCOPY, COLON, DIAGNOSTIC      ESOPHAGEAL MOTILITY STUDY N/A 12/6/2021    ESOPHAGEAL MOTILITY/MANOMETRY STUDY performed by George Levy DO at [address] [place] [address] FRACTURE SURGERY      Rt. Elbow    HYSTERECTOMY      partial    JOINT REPLACEMENT Left     knee    KNEE ARTHROSCOPY  06-04-12    left    KNEE ARTHROSCOPY      left    LITHOTRIPSY  12/20/2013    SKIN BIOPSY      TOTAL KNEE ARTHROPLASTY      Left Knee    TOTAL KNEE ARTHROPLASTY Right 7/30/2020    RIGHT TOTAL KNEE ARTHROPLASTY ([address]) performed by Rehana Guerra DO at [address] ENDOSCOPY N/A 12/6/2021    EGD BIOPSY performed by Jesse Mora MD at [address]   Allergen Reactions    Aspirin Other (See Comments)     bleeding    Iodine Hives and Swelling     Betadine   Shellfish    Medrol [Methylprednisolone] Anaphylaxis    Pork-Derived Products Swelling     Pig gut sutures    Rice Swelling    Sulfa Antibiotics Hives and Swelling    Tape [Adhesive Tape] Rash    Codeine      Gallbladder attack    Darvocet [Propoxyphene N-Acetaminophen]      Crohn's affected    Other      bandaids rash    Plasticized Base [Plastibase]        The patient has a family history that is negative for severe cardiovascular or respiratory issues, negative for reaction to anesthesia. Time spent reviewing past medical, surgical, social and family history, vitals, nursing assessment and images. No changes from above documented history.     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   Tobacco Use    Smoking status: Never Smoker    Smokeless tobacco: Never Used   Vaping Use    Vaping Use: Never used   Substance and Sexual Activity    Alcohol use: No    Drug use: No    Sexual activity: Not Currently     Partners: Male   Other Topics Concern    Not on file   Social History Narrative     twice    Estranged from her Carmen Morrow resides in [facility]; Other son is BiPolar gave him up at age 15 to the state     twice     Social Determinants of Health     Financial Resource Strain:     Difficulty of Paying Living Expenses: Not on file   Food Insecurity:     Worried About Running Out of Food in the Last Year: Not on file    [place] of Food in the Last Year: Not on file   Transportation Needs:     Lack of Transportation (Medical): Not on file    Lack of Transportation (Non-Medical):  Not on file   Physical Activity:     Days of Exercise per Week: Not on file    Minutes of Exercise per Session: Not on file   Stress:     Feeling of Stress : Not on file   Social Connections:     Frequency of Communication with Friends and Family: Not on file    Frequency of Social Gatherings with Friends and Family: Not on file    Attends Mormonism Services: Not on file    Active Member of [address] [place] [address] or Organizations: Not on file    Attends Club or Organization Meetings: Not on file    Marital Status: Not on file   Intimate Partner Violence:     Fear of Current or Ex-Partner: Not on file    Emotionally Abused: Not on file    Physically Abused: Not on file    Sexually Abused: Not on file   Housing Stability:     Unable to Pay for Housing in the Last Year: Not on file    Number of [place] in the Last Year: Not on file    Unstable Housing in the Last Year: Not on file           Review of Systems  Review of Systems -  General ROS: negative for - chills, fatigue or malaise  ENT ROS: negative for - hearing change, nasal congestion or nasal discharge  Allergy and Immunology ROS: negative for - hives, itchy/watery eyes or nasal congestion  Hematological and Lymphatic ROS: negative for - blood clots, blood transfusions, bruising or fatigue  Endocrine ROS: negative for - malaise/lethargy, mood swings, palpitations or polydipsia/polyuria  Respiratory ROS: negative for - sputum changes, stridor, tachypnea or wheezing  Cardiovascular ROS: negative for - irregular heartbeat, loss of consciousness, murmur or orthopnea  Gastrointestinal ROS: negative for - constipation, diarrhea, gas/bloating, heartburn or hematemesis  Genito-Urinary ROS: negative for -  genital discharge, genital ulcers or hematuria  Musculoskeletal ROS: negative for - gait disturbance, muscle pain or muscular weakness  Psych/Soc ROS: Neg suicidal ideation or visual/auditory hallucinations    Physical exam:   /66   Pulse 88   Temp 97.8 °F (36.6 °C) (Temporal)   Ht 5' 7\" (1.702 m)   Wt 239 lb (108.4 kg)   BMI 37.43 kg/m²   General appearance:  NAD  Head: NCAT, PERRLA, EOMI, red conjunctiva  Neck: supple, no masses  Lungs: CTAB, equal chest rise bilateral  Heart: Reg rate  Abdomen: soft, nontender, nondistended  Rectal: No bleeding  Skin; no lesions  Gu: no cva tenderness  Extremities: no edema  Psychosocial: No auditory or visual hallucinations      Radiology:     Manometry: Normal    Last EGD pathology: Diagnosis:   Stomach, antrum, biopsy: Mild chronic gastritis   Immunostain negative for Helicobacter pylori organisms   No evidence of intestinal metaplasia or malignancy     Assessment:  Tequila Melendez is a 77 y.o. female with symptomatic paraesophageal hernia, medically refractory GERD  Patient Active Problem List   Diagnosis    [facility]    Hypertension    COPD (chronic obstructive pulmonary disease) ([facility]    Arthritis    Hyperlipidemia    GERD (gastroesophageal reflux disease)    Anxiety and depression    Status post knee surgery    Hypotension    Arthritis of right knee         Plan:    Medical clearance  OR with lap poss robot assisted paraesophageal hernia repair with fundoplication, myofascial flap and poss gastropexy  Discussed the risk, benefits and alternatives of surgery including wound infections, bleeding, scar, recurrent hernia formation, dysphagia, inability to belch or vomit, stricture and need for dilation and

## 2021-12-23 ENCOUNTER — HOSPITAL ENCOUNTER (OUTPATIENT)
Dept: PREADMISSION TESTING | Age: 66
Discharge: HOME | End: 2021-12-23
Payer: COMMERCIAL

## 2021-12-23 ENCOUNTER — HOSPITAL ENCOUNTER (OUTPATIENT)
Dept: GENERAL RADIOLOGY | Age: 66
Discharge: HOME | End: 2021-12-25
Payer: COMMERCIAL

## 2021-12-23 VITALS
OXYGEN SATURATION: 96 % | BODY MASS INDEX: 38.3 KG/M2 | WEIGHT: 244 LBS | HEART RATE: 76 BPM | RESPIRATION RATE: 18 BRPM | TEMPERATURE: 97.6 F | HEIGHT: 67 IN

## 2021-12-23 DIAGNOSIS — K44.9 PARAESOPHAGEAL HERNIA: Primary | ICD-10-CM

## 2021-12-23 DIAGNOSIS — Z01.818 PREOP TESTING: ICD-10-CM

## 2021-12-23 LAB
ANION GAP SERPL CALCULATED.3IONS-SCNC: 10 MMOL/L (ref 7–16)
BUN BLDV-MCNC: 21 MG/DL (ref 6–23)
CALCIUM SERPL-MCNC: 9.1 MG/DL (ref 8.6–10.2)
CHLORIDE BLD-SCNC: 103 MMOL/L (ref 98–107)
CO2: 26 MMOL/L (ref 22–29)
CREAT SERPL-MCNC: 0.9 MG/DL (ref 0.5–1)
EKG ATRIAL RATE: 72 BPM
EKG P AXIS: 46 DEGREES
EKG P-R INTERVAL: 138 MS
EKG Q-T INTERVAL: 440 MS
EKG QRS DURATION: 94 MS
EKG QTC CALCULATION (BAZETT): 481 MS
EKG R AXIS: 3 DEGREES
EKG T AXIS: 26 DEGREES
EKG VENTRICULAR RATE: 72 BPM
GFR AFRICAN AMERICAN: >60
GFR NON-AFRICAN AMERICAN: >60 ML/MIN/1.73
GLUCOSE BLD-MCNC: 96 MG/DL (ref 74–99)
HCT VFR BLD CALC: 38.5 % (ref 34–48)
HEMOGLOBIN: 12.8 G/DL (ref 11.5–15.5)
MCH RBC QN AUTO: 30.5 PG (ref 26–35)
MCHC RBC AUTO-ENTMCNC: 33.2 % (ref 32–34.5)
MCV RBC AUTO: 91.9 FL (ref 80–99.9)
PDW BLD-RTO: 12.8 FL (ref 11.5–15)
PLATELET # BLD: 380 E9/L (ref 130–450)
PMV BLD AUTO: 9.3 FL (ref 7–12)
POTASSIUM REFLEX MAGNESIUM: 4.4 MMOL/L (ref 3.5–5)
RBC # BLD: 4.19 E12/L (ref 3.5–5.5)
SODIUM BLD-SCNC: 139 MMOL/L (ref 132–146)
WBC # BLD: 5.2 E9/L (ref 4.5–11.5)

## 2021-12-23 PROCEDURE — 71046 X-RAY EXAM CHEST 2 VIEWS: CPT

## 2021-12-23 PROCEDURE — 36415 COLL VENOUS BLD VENIPUNCTURE: CPT

## 2021-12-23 PROCEDURE — 80048 BASIC METABOLIC PNL TOTAL CA: CPT

## 2021-12-23 PROCEDURE — 93005 ELECTROCARDIOGRAM TRACING: CPT | Performed by: ANESTHESIOLOGY

## 2021-12-23 PROCEDURE — 85027 COMPLETE CBC AUTOMATED: CPT

## 2021-12-23 ASSESSMENT — PAIN SCALES - GENERAL: PAINLEVEL_OUTOF10: 0

## 2021-12-23 NOTE — PROGRESS NOTES
CBC, CMP, CXR, and EKG faxed to  [facility] office per his request, previous EKG dated 07/24/20 also faxed for comparison. Awaiting medical clearance. Results faxed to Dr Steen Walden office. Confirmations received.
surgery with ___Antibacterial soap /SHIVANI WIPES________    12. If you have a Living Will and Durable Power of  for Healthcare, please bring in a copy. 13. If appropriate bring crutches, inspirex, WALKER, CANE etc...    15. Notify your Surgeon if you develop any illness between now and surgery time, cough, cold, fever, sore throat, nausea, vomiting, etc.  Please notify your surgeon if you experience dizziness, shortness of breath or blurred vision between now & the time of your surgery. 15. If you have ___dentures, they will be removed before going to the OR; we will provide you a container. If you wear ___contact lenses or ___glasses, they will be removed; please bring a case for them. 16. To provide excellent care visitors will be limited to 1 in the room at any given time. 17. Please bring picture ID and insurance card. 18. Sleep apnea patients need to bring CPAP AND SETTINGS to hospital on day of surgery. 23. During flu season no children under the age of 15 are permitted in the hospital for the safety of all patients. 20.             Please call AMBULATORY CARE if you have any further questions.    [facility]     [address]

## 2021-12-28 NOTE — TELEPHONE ENCOUNTER
Medical clearance received from Dr. Thee Knox for lap robot [address] with Dr. Kendall Lombardi on 12/30/21.   Electronically signed by Kaci Layton RN on 12/28/2021 at 2:41 PM

## 2021-12-29 ENCOUNTER — ANESTHESIA EVENT (OUTPATIENT)
Dept: OPERATING ROOM | Age: 66
End: 2021-12-29
Payer: COMMERCIAL

## 2021-12-29 RX ORDER — HYDROCODONE BITARTRATE AND ACETAMINOPHEN 5; 325 MG/1; MG/1
2 TABLET ORAL PRN
Status: CANCELLED | OUTPATIENT
Start: 2021-12-29 | End: 2021-12-29

## 2021-12-29 RX ORDER — HYDROCODONE BITARTRATE AND ACETAMINOPHEN 5; 325 MG/1; MG/1
1 TABLET ORAL PRN
Status: CANCELLED | OUTPATIENT
Start: 2021-12-29 | End: 2021-12-29

## 2021-12-29 ASSESSMENT — COPD QUESTIONNAIRES: CAT_SEVERITY: NO INTERVAL CHANGE

## 2021-12-29 ASSESSMENT — LIFESTYLE VARIABLES: SMOKING_STATUS: 0

## 2021-12-29 ASSESSMENT — ENCOUNTER SYMPTOMS: DYSPNEA ACTIVITY LEVEL: NO INTERVAL CHANGE

## 2021-12-30 ENCOUNTER — ANESTHESIA (OUTPATIENT)
Dept: OPERATING ROOM | Age: 66
End: 2021-12-30
Payer: COMMERCIAL

## 2021-12-30 ENCOUNTER — HOSPITAL ENCOUNTER (OUTPATIENT)
Age: 66
Setting detail: OUTPATIENT SURGERY
Discharge: HOME | End: 2021-12-30
Attending: SURGERY | Admitting: SURGERY
Payer: COMMERCIAL

## 2021-12-30 VITALS
OXYGEN SATURATION: 79 % | TEMPERATURE: 96.3 F | SYSTOLIC BLOOD PRESSURE: 123 MMHG | RESPIRATION RATE: 1 BRPM | DIASTOLIC BLOOD PRESSURE: 97 MMHG

## 2021-12-30 VITALS
SYSTOLIC BLOOD PRESSURE: 131 MMHG | RESPIRATION RATE: 18 BRPM | WEIGHT: 244 LBS | DIASTOLIC BLOOD PRESSURE: 74 MMHG | BODY MASS INDEX: 38.3 KG/M2 | TEMPERATURE: 98.4 F | HEART RATE: 80 BPM | OXYGEN SATURATION: 97 % | HEIGHT: 67 IN

## 2021-12-30 DIAGNOSIS — G89.18 POSTOPERATIVE PAIN: Primary | ICD-10-CM

## 2021-12-30 PROCEDURE — 6370000000 HC RX 637 (ALT 250 FOR IP): Performed by: ANESTHESIOLOGY

## 2021-12-30 PROCEDURE — 7100000001 HC PACU RECOVERY - ADDTL 15 MIN: Performed by: SURGERY

## 2021-12-30 PROCEDURE — 7100000011 HC PHASE II RECOVERY - ADDTL 15 MIN: Performed by: SURGERY

## 2021-12-30 PROCEDURE — 3600000019 HC SURGERY ROBOT ADDTL 15MIN: Performed by: SURGERY

## 2021-12-30 PROCEDURE — 3700000001 HC ADD 15 MINUTES (ANESTHESIA): Performed by: SURGERY

## 2021-12-30 PROCEDURE — 6360000002 HC RX W HCPCS

## 2021-12-30 PROCEDURE — 6360000002 HC RX W HCPCS: Performed by: NURSE ANESTHETIST, CERTIFIED REGISTERED

## 2021-12-30 PROCEDURE — S2900 ROBOTIC SURGICAL SYSTEM: HCPCS | Performed by: SURGERY

## 2021-12-30 PROCEDURE — 2500000003 HC RX 250 WO HCPCS: Performed by: ANESTHESIOLOGY

## 2021-12-30 PROCEDURE — 2500000003 HC RX 250 WO HCPCS: Performed by: NURSE ANESTHETIST, CERTIFIED REGISTERED

## 2021-12-30 PROCEDURE — 3700000000 HC ANESTHESIA ATTENDED CARE: Performed by: SURGERY

## 2021-12-30 PROCEDURE — 7100000000 HC PACU RECOVERY - FIRST 15 MIN: Performed by: SURGERY

## 2021-12-30 PROCEDURE — 6360000002 HC RX W HCPCS: Performed by: SURGERY

## 2021-12-30 PROCEDURE — 2580000003 HC RX 258: Performed by: ANESTHESIOLOGY

## 2021-12-30 PROCEDURE — 7100000010 HC PHASE II RECOVERY - FIRST 15 MIN: Performed by: SURGERY

## 2021-12-30 PROCEDURE — 2720000010 HC SURG SUPPLY STERILE: Performed by: SURGERY

## 2021-12-30 PROCEDURE — 15734 MUSCLE-SKIN GRAFT TRUNK: CPT | Performed by: SURGERY

## 2021-12-30 PROCEDURE — 43281 LAP PARAESOPHAG HERN REPAIR: CPT | Performed by: SURGERY

## 2021-12-30 PROCEDURE — 3600000009 HC SURGERY ROBOT BASE: Performed by: SURGERY

## 2021-12-30 PROCEDURE — 2580000003 HC RX 258: Performed by: SURGERY

## 2021-12-30 PROCEDURE — 6360000002 HC RX W HCPCS: Performed by: ANESTHESIOLOGY

## 2021-12-30 PROCEDURE — 2709999900 HC NON-CHARGEABLE SUPPLY: Performed by: SURGERY

## 2021-12-30 PROCEDURE — 2500000003 HC RX 250 WO HCPCS: Performed by: SURGERY

## 2021-12-30 RX ORDER — ONDANSETRON 2 MG/ML
INJECTION INTRAMUSCULAR; INTRAVENOUS PRN
Status: DISCONTINUED | OUTPATIENT
Start: 2021-12-30 | End: 2021-12-30 | Stop reason: SDUPTHER

## 2021-12-30 RX ORDER — DOCUSATE SODIUM 100 MG/1
100 CAPSULE, LIQUID FILLED ORAL 2 TIMES DAILY
Qty: 30 CAPSULE | Refills: 0 | Status: SHIPPED | OUTPATIENT
Start: 2021-12-30 | End: 2022-01-14

## 2021-12-30 RX ORDER — NEOSTIGMINE METHYLSULFATE 1 MG/ML
INJECTION, SOLUTION INTRAVENOUS PRN
Status: DISCONTINUED | OUTPATIENT
Start: 2021-12-30 | End: 2021-12-30 | Stop reason: SDUPTHER

## 2021-12-30 RX ORDER — HYDRALAZINE HYDROCHLORIDE 20 MG/ML
5 INJECTION INTRAMUSCULAR; INTRAVENOUS EVERY 10 MIN PRN
Status: DISCONTINUED | OUTPATIENT
Start: 2021-12-30 | End: 2021-12-30 | Stop reason: HOSPADM

## 2021-12-30 RX ORDER — SODIUM CHLORIDE, SODIUM LACTATE, POTASSIUM CHLORIDE, CALCIUM CHLORIDE 600; 310; 30; 20 MG/100ML; MG/100ML; MG/100ML; MG/100ML
INJECTION, SOLUTION INTRAVENOUS CONTINUOUS
Status: DISCONTINUED | OUTPATIENT
Start: 2021-12-30 | End: 2021-12-30 | Stop reason: HOSPADM

## 2021-12-30 RX ORDER — FENTANYL CITRATE 50 UG/ML
INJECTION, SOLUTION INTRAMUSCULAR; INTRAVENOUS PRN
Status: DISCONTINUED | OUTPATIENT
Start: 2021-12-30 | End: 2021-12-30 | Stop reason: SDUPTHER

## 2021-12-30 RX ORDER — OXYCODONE HYDROCHLORIDE AND ACETAMINOPHEN 5; 325 MG/1; MG/1
1 TABLET ORAL EVERY 6 HOURS PRN
Qty: 18 TABLET | Refills: 0 | Status: SHIPPED | OUTPATIENT
Start: 2021-12-30 | End: 2022-01-04

## 2021-12-30 RX ORDER — LABETALOL HYDROCHLORIDE 5 MG/ML
5 INJECTION, SOLUTION INTRAVENOUS EVERY 10 MIN PRN
Status: DISCONTINUED | OUTPATIENT
Start: 2021-12-30 | End: 2021-12-30 | Stop reason: HOSPADM

## 2021-12-30 RX ORDER — GLYCOPYRROLATE 1 MG/5 ML
SYRINGE (ML) INTRAVENOUS PRN
Status: DISCONTINUED | OUTPATIENT
Start: 2021-12-30 | End: 2021-12-30 | Stop reason: SDUPTHER

## 2021-12-30 RX ORDER — SODIUM CHLORIDE 0.9 % (FLUSH) 0.9 %
10 SYRINGE (ML) INJECTION EVERY 12 HOURS SCHEDULED
Status: DISCONTINUED | OUTPATIENT
Start: 2021-12-30 | End: 2021-12-30 | Stop reason: HOSPADM

## 2021-12-30 RX ORDER — SODIUM CHLORIDE 9 MG/ML
25 INJECTION, SOLUTION INTRAVENOUS PRN
Status: DISCONTINUED | OUTPATIENT
Start: 2021-12-30 | End: 2021-12-30 | Stop reason: HOSPADM

## 2021-12-30 RX ORDER — MEPERIDINE HYDROCHLORIDE 25 MG/ML
12.5 INJECTION INTRAMUSCULAR; INTRAVENOUS; SUBCUTANEOUS EVERY 5 MIN PRN
Status: DISCONTINUED | OUTPATIENT
Start: 2021-12-30 | End: 2021-12-30 | Stop reason: HOSPADM

## 2021-12-30 RX ORDER — HYDRALAZINE HYDROCHLORIDE 20 MG/ML
INJECTION INTRAMUSCULAR; INTRAVENOUS
Status: COMPLETED
Start: 2021-12-30 | End: 2021-12-30

## 2021-12-30 RX ORDER — ONDANSETRON 4 MG/1
4 TABLET, ORALLY DISINTEGRATING ORAL EVERY 8 HOURS PRN
Qty: 20 TABLET | Refills: 1 | Status: SHIPPED | OUTPATIENT
Start: 2021-12-30

## 2021-12-30 RX ORDER — BUPIVACAINE HYDROCHLORIDE AND EPINEPHRINE 2.5; 5 MG/ML; UG/ML
INJECTION, SOLUTION EPIDURAL; INFILTRATION; INTRACAUDAL; PERINEURAL PRN
Status: DISCONTINUED | OUTPATIENT
Start: 2021-12-30 | End: 2021-12-30 | Stop reason: ALTCHOICE

## 2021-12-30 RX ORDER — SCOLOPAMINE TRANSDERMAL SYSTEM 1 MG/1
1 PATCH, EXTENDED RELEASE TRANSDERMAL ONCE
Status: DISCONTINUED | OUTPATIENT
Start: 2021-12-30 | End: 2021-12-30 | Stop reason: HOSPADM

## 2021-12-30 RX ORDER — DIPHENHYDRAMINE HYDROCHLORIDE 50 MG/ML
12.5 INJECTION INTRAMUSCULAR; INTRAVENOUS
Status: DISCONTINUED | OUTPATIENT
Start: 2021-12-30 | End: 2021-12-30 | Stop reason: HOSPADM

## 2021-12-30 RX ORDER — MIDAZOLAM HYDROCHLORIDE 1 MG/ML
INJECTION INTRAMUSCULAR; INTRAVENOUS PRN
Status: DISCONTINUED | OUTPATIENT
Start: 2021-12-30 | End: 2021-12-30 | Stop reason: SDUPTHER

## 2021-12-30 RX ORDER — ROCURONIUM BROMIDE 10 MG/ML
INJECTION, SOLUTION INTRAVENOUS PRN
Status: DISCONTINUED | OUTPATIENT
Start: 2021-12-30 | End: 2021-12-30 | Stop reason: SDUPTHER

## 2021-12-30 RX ORDER — LIDOCAINE HYDROCHLORIDE 20 MG/ML
INJECTION, SOLUTION INTRAVENOUS PRN
Status: DISCONTINUED | OUTPATIENT
Start: 2021-12-30 | End: 2021-12-30 | Stop reason: SDUPTHER

## 2021-12-30 RX ORDER — METHOCARBAMOL 500 MG/1
500 TABLET, FILM COATED ORAL 4 TIMES DAILY PRN
Qty: 20 TABLET | Refills: 0 | Status: SHIPPED | OUTPATIENT
Start: 2021-12-30

## 2021-12-30 RX ORDER — SODIUM CHLORIDE 9 MG/ML
INJECTION, SOLUTION INTRAVENOUS CONTINUOUS
Status: DISCONTINUED | OUTPATIENT
Start: 2021-12-30 | End: 2021-12-30 | Stop reason: HOSPADM

## 2021-12-30 RX ORDER — SODIUM CHLORIDE 0.9 % (FLUSH) 0.9 %
10 SYRINGE (ML) INJECTION PRN
Status: DISCONTINUED | OUTPATIENT
Start: 2021-12-30 | End: 2021-12-30 | Stop reason: HOSPADM

## 2021-12-30 RX ORDER — PROCHLORPERAZINE EDISYLATE 5 MG/ML
5 INJECTION INTRAMUSCULAR; INTRAVENOUS
Status: DISCONTINUED | OUTPATIENT
Start: 2021-12-30 | End: 2021-12-30 | Stop reason: HOSPADM

## 2021-12-30 RX ORDER — PROMETHAZINE HYDROCHLORIDE 25 MG/ML
6.25 INJECTION, SOLUTION INTRAMUSCULAR; INTRAVENOUS
Status: DISCONTINUED | OUTPATIENT
Start: 2021-12-30 | End: 2021-12-30 | Stop reason: HOSPADM

## 2021-12-30 RX ORDER — PROPOFOL 10 MG/ML
INJECTION, EMULSION INTRAVENOUS PRN
Status: DISCONTINUED | OUTPATIENT
Start: 2021-12-30 | End: 2021-12-30 | Stop reason: SDUPTHER

## 2021-12-30 RX ADMIN — HYDRALAZINE HYDROCHLORIDE 5 MG: 20 INJECTION INTRAMUSCULAR; INTRAVENOUS at 09:50

## 2021-12-30 RX ADMIN — Medication 2000 MG: at 08:04

## 2021-12-30 RX ADMIN — ONDANSETRON 4 MG: 2 INJECTION INTRAMUSCULAR; INTRAVENOUS at 07:56

## 2021-12-30 RX ADMIN — SODIUM CHLORIDE, POTASSIUM CHLORIDE, SODIUM LACTATE AND CALCIUM CHLORIDE: 600; 310; 30; 20 INJECTION, SOLUTION INTRAVENOUS at 08:46

## 2021-12-30 RX ADMIN — LIDOCAINE HYDROCHLORIDE 100 MG: 20 INJECTION, SOLUTION INTRAVENOUS at 07:56

## 2021-12-30 RX ADMIN — FENTANYL CITRATE 50 MCG: 50 INJECTION, SOLUTION INTRAMUSCULAR; INTRAVENOUS at 08:27

## 2021-12-30 RX ADMIN — FAMOTIDINE 20 MG: 10 INJECTION, SOLUTION INTRAVENOUS at 07:04

## 2021-12-30 RX ADMIN — Medication 0.6 MG: at 08:53

## 2021-12-30 RX ADMIN — FENTANYL CITRATE 150 MCG: 50 INJECTION, SOLUTION INTRAMUSCULAR; INTRAVENOUS at 07:56

## 2021-12-30 RX ADMIN — Medication 3 MG: at 08:53

## 2021-12-30 RX ADMIN — PROPOFOL 150 MG: 10 INJECTION, EMULSION INTRAVENOUS at 07:56

## 2021-12-30 RX ADMIN — HYDRALAZINE HYDROCHLORIDE 5 MG: 20 INJECTION INTRAMUSCULAR; INTRAVENOUS at 10:02

## 2021-12-30 RX ADMIN — FENTANYL CITRATE 50 MCG: 50 INJECTION, SOLUTION INTRAMUSCULAR; INTRAVENOUS at 09:04

## 2021-12-30 RX ADMIN — METHOCARBAMOL 1000 MG: 100 INJECTION, SOLUTION INTRAMUSCULAR; INTRAVENOUS at 09:26

## 2021-12-30 RX ADMIN — ROCURONIUM BROMIDE 40 MG: 10 INJECTION INTRAVENOUS at 07:56

## 2021-12-30 RX ADMIN — SODIUM CHLORIDE, POTASSIUM CHLORIDE, SODIUM LACTATE AND CALCIUM CHLORIDE: 600; 310; 30; 20 INJECTION, SOLUTION INTRAVENOUS at 06:59

## 2021-12-30 RX ADMIN — MIDAZOLAM 2 MG: 1 INJECTION INTRAMUSCULAR; INTRAVENOUS at 07:49

## 2021-12-30 ASSESSMENT — PULMONARY FUNCTION TESTS
PIF_VALUE: 2
PIF_VALUE: 30
PIF_VALUE: 19
PIF_VALUE: 1
PIF_VALUE: 32
PIF_VALUE: 29
PIF_VALUE: 1
PIF_VALUE: 30
PIF_VALUE: 23
PIF_VALUE: 31
PIF_VALUE: 30
PIF_VALUE: 30
PIF_VALUE: 29
PIF_VALUE: 30
PIF_VALUE: 31
PIF_VALUE: 2
PIF_VALUE: 31
PIF_VALUE: 2
PIF_VALUE: 23
PIF_VALUE: 29
PIF_VALUE: 26
PIF_VALUE: 1
PIF_VALUE: 29
PIF_VALUE: 42
PIF_VALUE: 29
PIF_VALUE: 28
PIF_VALUE: 30
PIF_VALUE: 30
PIF_VALUE: 20
PIF_VALUE: 31
PIF_VALUE: 43
PIF_VALUE: 30
PIF_VALUE: 30
PIF_VALUE: 1
PIF_VALUE: 21
PIF_VALUE: 27
PIF_VALUE: 30
PIF_VALUE: 3
PIF_VALUE: 27
PIF_VALUE: 30
PIF_VALUE: 1
PIF_VALUE: 30
PIF_VALUE: 27
PIF_VALUE: 20
PIF_VALUE: 29
PIF_VALUE: 4
PIF_VALUE: 30
PIF_VALUE: 30
PIF_VALUE: 27
PIF_VALUE: 30
PIF_VALUE: 30
PIF_VALUE: 29
PIF_VALUE: 30
PIF_VALUE: 31
PIF_VALUE: 30
PIF_VALUE: 29
PIF_VALUE: 22
PIF_VALUE: 31
PIF_VALUE: 3
PIF_VALUE: 2
PIF_VALUE: 30
PIF_VALUE: 30
PIF_VALUE: 31
PIF_VALUE: 20
PIF_VALUE: 55
PIF_VALUE: 31
PIF_VALUE: 3
PIF_VALUE: 27
PIF_VALUE: 29
PIF_VALUE: 1
PIF_VALUE: 30
PIF_VALUE: 31
PIF_VALUE: 30
PIF_VALUE: 0

## 2021-12-30 ASSESSMENT — PAIN DESCRIPTION - PAIN TYPE
TYPE: SURGICAL PAIN
TYPE: SURGICAL PAIN
TYPE: SURGICAL PAIN;CHRONIC PAIN

## 2021-12-30 ASSESSMENT — PAIN - FUNCTIONAL ASSESSMENT
PAIN_FUNCTIONAL_ASSESSMENT: PREVENTS OR INTERFERES SOME ACTIVE ACTIVITIES AND ADLS
PAIN_FUNCTIONAL_ASSESSMENT: 0-10
PAIN_FUNCTIONAL_ASSESSMENT: ACTIVITIES ARE NOT PREVENTED

## 2021-12-30 ASSESSMENT — PAIN DESCRIPTION - ORIENTATION: ORIENTATION: RIGHT;LEFT;ANTERIOR

## 2021-12-30 ASSESSMENT — PAIN DESCRIPTION - PROGRESSION
CLINICAL_PROGRESSION: GRADUALLY WORSENING
CLINICAL_PROGRESSION: GRADUALLY IMPROVING
CLINICAL_PROGRESSION: GRADUALLY WORSENING

## 2021-12-30 ASSESSMENT — PAIN DESCRIPTION - LOCATION
LOCATION: SHOULDER
LOCATION: ABDOMEN
LOCATION: ABDOMEN

## 2021-12-30 ASSESSMENT — PAIN DESCRIPTION - ONSET
ONSET: ON-GOING

## 2021-12-30 ASSESSMENT — PAIN DESCRIPTION - FREQUENCY
FREQUENCY: CONTINUOUS
FREQUENCY: INTERMITTENT
FREQUENCY: CONTINUOUS

## 2021-12-30 ASSESSMENT — PAIN SCALES - GENERAL
PAINLEVEL_OUTOF10: 4
PAINLEVEL_OUTOF10: 5
PAINLEVEL_OUTOF10: 6

## 2021-12-30 ASSESSMENT — PAIN DESCRIPTION - DESCRIPTORS
DESCRIPTORS: DULL
DESCRIPTORS: DULL
DESCRIPTORS: ACHING

## 2021-12-30 NOTE — H&P
General Surgery History and Physical  [facility]    Patient's Name/Date of Birth: Meena Long / 1955    Date: December 30, 2021     Surgeon: Tammy Hobbs M.D.    PCP: Eliud Juan DO     Chief Complaint: GERD, HIATAL HERNIA    HPI:   Meena Long is a 77 y.o. female who presents for evaluation of GERD and symptomatic hiatal hernia. Timing is intermittent with food, radiation to midline and epigastrium, alleviated by npo and started several months ago and severity is 8/10. Has been under medical management for GERD for many months, symptoms have become refractory to maximal medical therapy. Admits early satiety and regurgitation. Denies SOB, fever, chills, nausea, vomiting. Has had endoscopy performed to evaluate for esophagitis more than 5 yrs ago. Returns after Cristiana. Normal esophageal motility without achalasia.      Patient Active Problem List   Diagnosis    Crohn disease ([address]    Hypertension    COPD (chronic obstructive pulmonary disease) ([address]    Arthritis    Hyperlipidemia    GERD (gastroesophageal reflux disease)    Anxiety and depression    Status post knee surgery    Hypotension    Arthritis of right knee       Past Medical History:   Diagnosis Date    Anesthesia     blood pressure goes low during surgery    Anxiety and depression     Arthritis     Crohn disease ([address]     IBS    GERD (gastroesophageal reflux disease)     Hyperlipidemia     Hypertension     Hypoglycemia     Nausea & vomiting     PONV (postoperative nausea and vomiting)     Sleep apnea     Thyroid disease     hx/no tx @ present       Past Surgical History:   Procedure Laterality Date    BLADDER SURGERY  bladder bx benign and urethra stretched    BLADDER SUSPENSION      cystocele repair also    COLONOSCOPY      ENDOSCOPY, COLON, DIAGNOSTIC      ESOPHAGEAL MOTILITY STUDY N/A 12/6/2021    ESOPHAGEAL MOTILITY/MANOMETRY STUDY performed by Ciro Shone, DO at [address] [place] [address] FRACTURE SURGERY      Rt. Elbow    HYSTERECTOMY      partial    JOINT REPLACEMENT Left     bilateral knee    KNEE ARTHROSCOPY  06-04-12    left    KNEE ARTHROSCOPY      left    LITHOTRIPSY  12/20/2013    SKIN BIOPSY      TOTAL KNEE ARTHROPLASTY      Left Knee    TOTAL KNEE ARTHROPLASTY Right 7/30/2020    RIGHT TOTAL KNEE ARTHROPLASTY ([address]) performed by Shanta Purvis DO at [address] [facility] ENDOSCOPY N/A 12/6/2021    EGD BIOPSY performed by Talha Fierro MD at [address]   Allergen Reactions    Aspirin Other (See Comments)     bleeding    Iodine Hives and Swelling     Betadine   Shellfish    Medrol [Methylprednisolone] Anaphylaxis    Pork-Derived Products Swelling     Pig gut sutures    Rice Swelling    Sulfa Antibiotics Hives and Swelling    Tape [Adhesive Tape] Rash    Codeine      Gallbladder attack    Darvocet [Propoxyphene N-Acetaminophen]      Crohn's affected    Other      bandaids rash    Plasticized Base [Plastibase]        The patient has a family history that is negative for severe cardiovascular or respiratory issues, negative for reaction to anesthesia. Time spent reviewing past medical, surgical, social and family history, vitals, nursing assessment and images. No changes from above documented history.     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   Tobacco Use    Smoking status: Never Smoker    Smokeless tobacco: Never Used   Vaping Use    Vaping Use: Never used   Substance and Sexual Activity    Alcohol use: No    Drug use: No    Sexual activity: Not Currently     Partners: Male   Other Topics Concern    Not on file   Social History Narrative  twice    Estranged from her Preet Sienna resides in [facility]; Other son is [employer] gave him up at age 15 to the state     twice     Social Determinants of Health     Financial Resource Strain:     Difficulty of Paying Living Expenses: Not on file   Food Insecurity:     Worried About Running Out of Food in the Last Year: Not on file    [place] of Food in the Last Year: Not on file   Transportation Needs:     Lack of Transportation (Medical): Not on file    Lack of Transportation (Non-Medical):  Not on file   Physical Activity:     Days of Exercise per Week: Not on file    Minutes of Exercise per Session: Not on file   Stress:     Feeling of Stress : Not on file   Social Connections:     Frequency of Communication with Friends and Family: Not on file    Frequency of Social Gatherings with Friends and Family: Not on file    Attends Hindu Services: Not on file    Active Member of [address] [place] [address] or Organizations: Not on file    Attends Club or Organization Meetings: Not on file    Marital Status: Not on file   Intimate Partner Violence:     Fear of Current or Ex-Partner: Not on file    Emotionally Abused: Not on file    Physically Abused: Not on file    Sexually Abused: Not on file   Housing Stability:     Unable to Pay for Housing in the Last Year: Not on file    Number of [place] in the Last Year: Not on file    Unstable Housing in the Last Year: Not on file           Review of Systems  Review of Systems -  General ROS: negative for - chills, fatigue or malaise  ENT ROS: negative for - hearing change, nasal congestion or nasal discharge  Allergy and Immunology ROS: negative for - hives, itchy/watery eyes or nasal congestion  Hematological and Lymphatic ROS: negative for - blood clots, blood transfusions, bruising or fatigue  Endocrine ROS: negative for - malaise/lethargy, mood swings, palpitations or polydipsia/polyuria  Respiratory ROS: negative for - sputum changes, stridor, tachypnea or wheezing  Cardiovascular ROS: negative for - irregular heartbeat, loss of consciousness, murmur or orthopnea  Gastrointestinal ROS: negative for - constipation, diarrhea, gas/bloating, heartburn or hematemesis  Genito-Urinary ROS: negative for -  genital discharge, genital ulcers or hematuria  Musculoskeletal ROS: negative for - gait disturbance, muscle pain or muscular weakness  Psych/Soc ROS: Neg suicidal ideation or visual/auditory hallucinations    Physical exam:   BP (!) 149/70   Pulse 72   Temp 97.2 °F (36.2 °C) (Infrared)   Resp 18   Ht 5' 7\" (1.702 m)   Wt 244 lb (110.7 kg)   SpO2 97%   BMI 38.22 kg/m²   General appearance:  NAD  Head: NCAT, PERRLA, EOMI, red conjunctiva  Neck: supple, no masses  Lungs: CTAB, equal chest rise bilateral  Heart: Reg rate  Abdomen: soft, nontender, nondistended  Rectal: No bleeding  Skin; no lesions  Gu: no cva tenderness  Extremities: no edema  Psychosocial: No auditory or visual hallucinations      Radiology:     Manometry: Normal    Last EGD pathology: Diagnosis:   Stomach, antrum, biopsy: Mild chronic gastritis   Immunostain negative for Helicobacter pylori organisms   No evidence of intestinal metaplasia or malignancy     Assessment:  Yakov Miller is a 77 y.o. female with symptomatic paraesophageal hernia, medically refractory GERD  Patient Active Problem List   Diagnosis    [facility] disease ([facility]    Hypertension    COPD (chronic obstructive pulmonary disease) ([facility]    Arthritis    Hyperlipidemia    GERD (gastroesophageal reflux disease)    Anxiety and depression    Status post knee surgery    Hypotension    Arthritis of right knee         Plan:    Medical clearance  OR with lap poss robot assisted paraesophageal hernia repair with fundoplication, myofascial flap and poss gastropexy  Discussed the risk, benefits and alternatives of surgery including wound infections, bleeding, scar, recurrent hernia formation, dysphagia, inability to belch or vomit, stricture and need for dilation and the risks of general anesthetic including MI, CVA, sudden death or reactions to anesthetic medications. The patient understands the risks and alternatives and the possibility of converting to an open procedure. All questions were answered to the patient's satisfaction and they freely signed the consent.              Frederic Hernandez MD  7:32 AM  12/30/2021

## 2021-12-30 NOTE — OP NOTE
Gill Sadler     Operative Note  [facility]     DATE OF PROCEDURE: 12/30/2021      SURGEON: Donte Venegas M.D.      ASSISTANT:  None      PREOPERATIVE DIAGNOSIS:   1. Paraesophageal hernia (K44.9)  2. 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 Gastroesophageal Reflux Disease      POSTOPERATIVE DIAGNOSIS:   1. Paraesophageal hernia  2. 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 Medically refractory GERD      OPERATION:   1. Laparoscopic robot assisted paraesophageal hernia repair and Toupet fundoplication (30669)  2. Mobilization and placement of myofascial flap buttress (62701)      ANESTHESIA: General endotracheal      ESTIMATED BLOOD LOSS: 5cc      COMPLICATIONS: None. FLUIDS: Crystalloids. SPECIMEN: none      INDICATIONS: Gill Sadler is a 77 y.o. female with a history of dysphagia, reflux, early satiety and chronic belching. After being explained the risks, benefits and alternatives of the procedure, including but not limited to postop nausea, vomiting, dysphagia, vagal and other nerve and vascular injury, bleeding, recurrence of the hernia. They had undergone appropriate preoperative testing. Imaging and endoscopy had confirmed presence of a large paraesophageal hernia without evidence of dysplasia at the gastroesophageal junction/ The patient was explained the risks, benefits and alternatives of the procedure. They agreed to proceed. DESCRIPTION OF PROCEDURE: They was taken to the operating room, placed supine on the operating table and administered general anesthesia and intubated. Once the airway was secured and they were adequately sedated, the patient prepped and draped in normal sterile fashion. Time-out was performed to confirm the surgical site and the patient's name. The patient received antibiotics IV preoperatively within 30min of incision.  Bilateral pneumatic compression devices were placed on lower extremities for DVT prophylaxis prior to induction. We initially made a 8mm incision in the superior to the umbilicus, inserted a Veress needle, confirmed needle placement and insufflated to 15 mmHg. We then inserted a 8 mm trocar midline under direct visualization, removed the Veress needle, inserted two more 8 mm trocars in the left upper quadrant under direct visualization. At this point, we retracted the liver cephalad and identified a large paraesophageal hiatal defect. Another 8 mm trocar was inserted in the right upper quadrant. The robot was docked over the left side. Rodena Severin liver retractor was then inserted subxiphoid and the liver was retracted cephalad and secured being careful not to compromise vascular supply. The liver was distended and fatty. We then used traction and the Harmonic scalpel to take down the hernia sac, starting at the right tatyana. The right and anterior tatyana were  from the esophagus and right pleura without injury to the pleura or posterior vagal branches. The right vagus was identified coursing along the esophagus and diving posterior and remained adhered to the esophagus and was not injured. We dissected around the esophagus 360 degrees. The crural lining was preserved on both sides of the dissection. The left vagus was identified exiting from the mediastinum on the left and coursing anterior across the esophagus. It was preserved and not inured. The left pleura was left intact and not violated but carefully released from the hernia sac. The aorta was also left free of injury, identified and  from the hernia sac and adhesions to the esophagus. Anteriorly the dissection was undertaken to separate the pericardium from the esophagus and completion of the mediastinal dissection. We dissected it up into the chest at least 10.5 cm, identified multiple adhesions up within the chest to the level of the inferior pulmonary vein that was left uninjured.   We were able to reduce the hernia sac and gain 3 cm of intra-abdominal esophagus and it laid tension free. Both the left and right vagus nerves were identified in their respective anterior and posterior positions. Both remained free of injury and adhered to the esophagus again preserving both vagal trunks. We then took down about 10cm of the short gastrics as they approached the most gastroesophageal junction, creating a window anterior and posterior. The spleen was identified, its vascular supply preserved and the capsule not violated. There was no evidence of bleeding after mobilization of the short gastrics. The esophagus laid within the abdomen without any retraction or tension. We then performed our crural repair retracting the esophagus to patient left and used 0 Vicryl pledget sutures, placed in simple interrupted sutures to reapproximated the crural apparatus leaving an 4 mm posterior window. The esophagus laid without any tension. The vagal branches were again identified, intact and unharmed. A Harmonic scalpel was used to take down the falciform ligament all the way down to the separation of the left lateral segment at the liver at its origin mobilizing it as a myofascial flap leaving the blood supply intact. The liver capsule was not violated and we stayed superficial to the liver hilum and portal triad. We then mobilized and placed this myofascial pedicle in the form of the falciform ligament posterior over the area of our repair, secured it in place with 2-0 silk suture. It laid without tension and the blood supply from the falciform artery and paraumbilical veins. We then performed a partial fundoplication by wrapping the stomach posterior using the fundus wrap in a posterior 270 degrees and securing it to the esophagus with 3 separate interrupted 2-0 silk sutures on either side from the posterior wall of the fundus to the esophagus. The fundal wrap was at the GEJ and extended 3cm above. Photographs were taken.    Upper endoscopy was then performed to confirm easy passage of the endoscope into the stomach. A lubricated endoscope was passed without resistance through a biteblock into the oropharynx and into the esophagus. The scope passed easily distally to the level of the gastroesophageal junction. The hiatal repair was easily traversed, there was not kinking with only a slight angulation anterior, the wrap was passed easily as well and it was not too tight. Air and fluid passed from the scope through the gastroesophageal junction without issue. Retroflexion revealed a nice 270 degree wrap with no visible suture and no evidence of ischemia or undesired twisting. The scope was then withdrawn after decompressing the stomach. The patient tolerated the procedure well. We then removed the liver retractor under direct visualization and each one of our trocars after closing each one of the skin incisions with a 4-0 Monocryl stitch. Surgical glue was placed over the top. The patient was then awoken and extubated in the operating room without any difficulty and transferred to the postoperative care unit in stable condition. All instrument counts, lap counts and needle counts were correct at the completion of the procedure.           Karen Sow MD, MS  Minimally Invasive and Bariatric Surgery  [phone] (p)  12/30/2021  9:01 AM

## 2021-12-30 NOTE — PROGRESS NOTES
CLINICAL PHARMACY NOTE: MEDS TO BEDS    Total # of Prescriptions Filled: 2   The following medications were delivered to the patient:  · ondasetron 4mg ODT  · Oxycodone/apap 5-325  · Methocarbamol 500 mg    Additional Documentation:

## 2021-12-30 NOTE — ANESTHESIA POSTPROCEDURE EVALUATION
Department of Anesthesiology  Postprocedure Note    Patient: Toby Lainez  MRN: [id number]  YOB: 1955  Date of evaluation: 12/30/2021  Time:  2:11 PM     Procedure Summary     Date: 12/30/21 Room / Location: [address] [place] [address] / [address] [place] [address]    Anesthesia Start: 6993 Anesthesia Stop: 0579    Procedure: LAPAROSCOPIC ROBOTIC XI PARAESOPHAGEAL HERNIA REPAIR (N/A Abdomen) Diagnosis: (PARAESOPHAGEAL HERNIA REPAIR, GERD)    Surgeons: Eren Bird MD Responsible Provider: Lencho Payne MD    Anesthesia Type: general ASA Status: 3          Anesthesia Type: general    Dennis Phase I: Dennis Score: 10    Dennis Phase II: Dennis Score: 10    Last vitals: Reviewed and per EMR flowsheets.        Anesthesia Post Evaluation    Patient location during evaluation: PACU  Patient participation: complete - patient participated  Level of consciousness: awake  Pain score: 3  Airway patency: patent  Nausea & Vomiting: no nausea and no vomiting  Complications: no  Cardiovascular status: hemodynamically stable  Respiratory status: acceptable  Hydration status: euvolemic

## 2021-12-30 NOTE — ANESTHESIA PRE PROCEDURE
Department of Anesthesiology  Preprocedure Note       Name:  Sammy Horton   Age:  77 y.o.  :  1955                                          MRN:  [id number]         Date:  2021      Surgeon: Nick Garcia):  Jojo Hoffman MD    Procedure: Procedure(s):  LAPAROSCOPIC ROBOTIC XI PARAESOPHAGEAL HERNIA REPAIR    Medications prior to admission:   Prior to Admission medications    Medication Sig Start Date End Date Taking? Authorizing Provider   E-400 180 MG (400 UNIT) CAPS capsule Take 400 Units by mouth 2 times daily  21   Historical Provider, MD   Naproxen Sodium (ALEVE) 220 MG CAPS Take by mouth 2 times daily    Historical Provider, MD   dexlansoprazole (DEXILANT) 60 MG CPDR delayed release capsule Take 60 mg by mouth daily    Historical Provider, MD   albuterol sulfate  (90 Base) MCG/ACT inhaler Inhale 2 puffs into the lungs every 6 hours as needed  10/8/21   Historical Provider, MD   glycopyrrolate (ROBINUL) 2 MG tablet Take 2 mg by mouth 2 times daily  10/25/21   Historical Provider, MD   hydrOXYzine (VISTARIL) 25 MG capsule Take 25 mg by mouth 3 times daily as needed  21   Historical Provider, MD   loratadine (CLARITIN) 10 MG tablet Take 10 mg by mouth daily  10/29/21   Historical Provider, MD   potassium chloride (KLOR-CON M) 20 MEQ TBCR extended release tablet Take 20 mEq by mouth daily (with breakfast)    Historical Provider, MD   Lactobacillus (ACIDOPHILUS PO) Take 25 Million Cells by mouth daily 1 or 2    Historical Provider, MD   magnesium oxide (MAG-OX) 400 MG tablet Take 250 mg by mouth daily     Historical Provider, MD   Melatonin 10 MG TBDP Take by mouth every other day     Historical Provider, MD   vitamin B-12 (CYANOCOBALAMIN) 1000 MCG tablet Take 1,000 mcg by mouth daily. Historical Provider, MD   Mesalamine (ASACOL HD) 800 MG TBEC Take 1 tablet by mouth 2 times daily.       Historical Provider, MD   hydrOXYzine (ATARAX) 25 MG tablet Take 25 mg by mouth 2 times daily Takes one tablet in AM, two tablets in the PM    Historical Provider, MD   paroxetine (PAXIL) 20 MG tablet Take 25 mg by mouth every morning     Historical Provider, MD   acyclovir (ZOVIRAX) 800 MG tablet Take 800 mg by mouth daily. Historical Provider, MD   lisinopril (PRINIVIL;ZESTRIL) 10 MG tablet Take 10 mg by mouth daily. Historical Provider, MD   Cholecalciferol (VITAMIN D3) 2000 UNITS CAPS Take 1 tablet by mouth daily. Historical Provider, MD   simvastatin (ZOCOR) 40 MG tablet Take 40 mg by mouth Daily     Historical Provider, MD   spironolactone (ALDACTONE) 25 MG tablet Take 25 mg by mouth 2 times daily. Historical Provider, MD       Current medications:    Current Outpatient Medications   Medication Sig Dispense Refill    E-400 180 MG (400 UNIT) CAPS capsule Take 400 Units by mouth 2 times daily       Naproxen Sodium (ALEVE) 220 MG CAPS Take by mouth 2 times daily      dexlansoprazole (DEXILANT) 60 MG CPDR delayed release capsule Take 60 mg by mouth daily      albuterol sulfate  (90 Base) MCG/ACT inhaler Inhale 2 puffs into the lungs every 6 hours as needed       glycopyrrolate (ROBINUL) 2 MG tablet Take 2 mg by mouth 2 times daily       hydrOXYzine (VISTARIL) 25 MG capsule Take 25 mg by mouth 3 times daily as needed       loratadine (CLARITIN) 10 MG tablet Take 10 mg by mouth daily       potassium chloride (KLOR-CON M) 20 MEQ TBCR extended release tablet Take 20 mEq by mouth daily (with breakfast)      Lactobacillus (ACIDOPHILUS PO) Take 25 Million Cells by mouth daily 1 or 2      magnesium oxide (MAG-OX) 400 MG tablet Take 250 mg by mouth daily       Melatonin 10 MG TBDP Take by mouth every other day       vitamin B-12 (CYANOCOBALAMIN) 1000 MCG tablet Take 1,000 mcg by mouth daily.  Mesalamine (ASACOL HD) 800 MG TBEC Take 1 tablet by mouth 2 times daily.         hydrOXYzine (ATARAX) 25 MG tablet Take 25 mg by mouth 2 times daily Takes one tablet in AM, two tablets in the PM      paroxetine (PAXIL) 20 MG tablet Take 25 mg by mouth every morning       acyclovir (ZOVIRAX) 800 MG tablet Take 800 mg by mouth daily.  lisinopril (PRINIVIL;ZESTRIL) 10 MG tablet Take 10 mg by mouth daily.  Cholecalciferol (VITAMIN D3) 2000 UNITS CAPS Take 1 tablet by mouth daily.  simvastatin (ZOCOR) 40 MG tablet Take 40 mg by mouth Daily       spironolactone (ALDACTONE) 25 MG tablet Take 25 mg by mouth 2 times daily. No current facility-administered medications for this visit. Allergies: Allergies   Allergen Reactions    Aspirin Other (See Comments)     bleeding    Iodine Hives and Swelling     Betadine   Shellfish    Medrol [Methylprednisolone] Anaphylaxis    Pork-Derived Products Swelling     Pig gut sutures    Rice Swelling    Sulfa Antibiotics Hives and Swelling    Tape Theo Standing Tape] Rash    Codeine      Gallbladder attack    Darvocet [Propoxyphene N-Acetaminophen]      Crohn's affected    Other      bandaids rash    Plasticized Base [Plastibase]        Problem List:    Patient Active Problem List   Diagnosis Code    Crohn disease ([facility] [address] K50.90    Hypertension I10    COPD (chronic obstructive pulmonary disease) (HCC) J44.9    Arthritis M19.90    Hyperlipidemia E78.5    GERD (gastroesophageal reflux disease) K21.9    Anxiety and depression F41.9, F32. A    Status post knee surgery Z98.890    Hypotension I95.9    Arthritis of right knee M17.11       Past Medical History:        Diagnosis Date    Anesthesia     blood pressure goes low during surgery    Anxiety and depression     Arthritis     Crohn disease ([facility] [address]     IBS    GERD (gastroesophageal reflux disease)     Hyperlipidemia     Hypertension     Hypoglycemia     Nausea & vomiting     PONV (postoperative nausea and vomiting)     Sleep apnea     Thyroid disease     hx/no tx @ present       Past Surgical History:        Procedure Laterality Date    BLADDER SURGERY  bladder bx benign and urethra stretched    BLADDER SUSPENSION      cystocele repair also    COLONOSCOPY      ENDOSCOPY, COLON, DIAGNOSTIC      ESOPHAGEAL MOTILITY STUDY N/A 12/6/2021    ESOPHAGEAL MOTILITY/MAN[facility]ET[facility] El[facility]    HYSTERECTOMY      partial    JOINT REPLACEMENT Left     bilateral knee    KNEE ARTHROSCOPY  06-04-12    left    KNEE ARTHROSCOPY      left    LITHOTRIPSY  12/20/2013    SKIN BIOPSY      TOTAL KNEE ARTHROPLASTY      Left Knee    TOTAL KNEE ARTHROPLASTY Right 7/30/2020    RIGHT TOTAL KNEE ARTHROPLASTY (DEPUY) performed by Chuyita Guillaume DO at [address] [facility] ENDOSCOPY N/A 12/6/2021    EGD BIOPSY performed by Park Peters MD at [facility] History:    Social History     Tobacco Use    Smoking status: Never Smoker    Smokeless tobacco: Never Used   Substance Use Topics    Alcohol use: No                                Counseling given: Not Answered      Vital Signs (Current): There were no vitals filed for this visit.                                            BP Readings from Last 3 Encounters:   12/22/21 131/66   12/06/21 (!) 146/66   12/06/21 (!) 123/56       NPO Status:                                                                                 BMI:   Wt Readings from Last 3 Encounters:   12/23/21 244 lb (110.7 kg)   12/22/21 239 lb (108.4 kg)   12/06/21 239 lb (108.4 kg)     There is no height or weight on file to calculate BMI.    CBC:   Lab Results   Component Value Date    WBC 5.2 12/23/2021    RBC 4.19 12/23/2021    HGB 12.8 12/23/2021    HCT 38.5 12/23/2021    MCV 91.9 12/23/2021    RDW 12.8 12/23/2021     12/23/2021       CMP:   Lab Results   Component Value Date     12/23/2021    K 4.4 12/23/2021     12/23/2021    CO2 26 12/23/2021    BUN 21 12/23/2021    CREATININE 0.9 12/23/2021    GFRAA >60 12/23/2021    LABGLOM >60 12/23/2021    GLUCOSE 96 12/23/2021    GLUCOSE 85 04/25/2012    PROT 6.3 07/31/2020    CALCIUM 9.1 12/23/2021    BILITOT 0.5 07/31/2020    ALKPHOS 55 07/31/2020    AST 13 07/31/2020    ALT 10 07/31/2020       POC Tests: No results for input(s): POCGLU, POCNA, POCK, POCCL, POCBUN, POCHEMO, POCHCT in the last 72 hours. Coags:   Lab Results   Component Value Date    PROTIME 12.1 07/24/2020    INR 1.1 07/24/2020    APTT 33.1 07/24/2020       HCG (If Applicable): No results found for: PREGTESTUR, PREGSERUM, HCG, HCGQUANT     ABGs: No results found for: PHART, PO2ART, RRM2MKB, ZLF3ZNF, BEART, E4FBEFWB     Type & Screen (If Applicable):  No results found for: LABABO, LABRH    Drug/Infectious Status (If Applicable):  No results found for: HIV, HEPCAB    COVID-19 Screening (If Applicable):   Lab Results   Component Value Date    COVID19 Not Detected 07/24/2020           Anesthesia Evaluation  Patient summary reviewed   history of anesthetic complications ( blood pressure goes low during surgery): PONV. Airway: Mallampati: III  TM distance: >3 FB   Neck ROM: full  Mouth opening: > = 3 FB Dental:      Comment: Full upper denture, lower front incisors remain intact. Denies any loose teeth.     Pulmonary:   (+) COPD (Oxygen saturation 95% on RA - noncompliant with nebulizer therapy): no interval change,  sleep apnea: on CPAP,  decreased breath sounds,      (-) not a current smoker                          ROS comment: Seasonal allergies   Cardiovascular:  Exercise tolerance: good (>4 METS),   (+) hypertension: mild, MCMAHON: no interval change, murmur (Grade I), hyperlipidemia      ECG reviewed  Rhythm: irregular  Rate: normal           Beta Blocker:  Not on Beta Blocker      ROS comment: Normal sinus rhythm  Normal ekg  No previous ECGs available    PE comment: Sinus arrhythmia   Neuro/Psych:   (+) psychiatric history:depression/anxiety             GI/Hepatic/Renal:   (+) GERD: no interval change, ROS comment: Crohn's disease. Endo/Other:    (+) blood dyscrasia (9.6/31.0): anemia, arthritis: OA., . Pt had no PAT visit       Abdominal:   (+) obese,           Vascular: negative vascular ROS. Other Findings:             Anesthesia Plan      general     ASA 3       Induction: intravenous. MIPS: Postoperative opioids intended and Prophylactic antiemetics administered. Anesthetic plan and risks discussed with patient. Plan discussed with CRNA.                   Jessenia Lane MD   12/29/2021

## 2022-01-12 ENCOUNTER — OFFICE VISIT (OUTPATIENT)
Dept: SURGERY | Age: 67
End: 2022-01-12

## 2022-01-12 VITALS
SYSTOLIC BLOOD PRESSURE: 126 MMHG | WEIGHT: 244 LBS | OXYGEN SATURATION: 96 % | RESPIRATION RATE: 18 BRPM | BODY MASS INDEX: 38.3 KG/M2 | TEMPERATURE: 97 F | DIASTOLIC BLOOD PRESSURE: 78 MMHG | HEIGHT: 67 IN | HEART RATE: 83 BPM

## 2022-01-12 DIAGNOSIS — K21.00 GASTROESOPHAGEAL REFLUX DISEASE WITH ESOPHAGITIS WITHOUT HEMORRHAGE: Primary | ICD-10-CM

## 2022-01-12 DIAGNOSIS — K44.9 PARAESOPHAGEAL HERNIA: ICD-10-CM

## 2022-01-12 PROCEDURE — 99024 POSTOP FOLLOW-UP VISIT: CPT | Performed by: SURGERY

## 2022-01-12 NOTE — PROGRESS NOTES
General Surgery Office Note  Ruchi Ortega MD, MS    Patient's Name/Date of Birth: Jacqui Garzon / 1955    Date: January 12, 2022     Chief compaint: Postop visit from laparoscopic robot assisted PEH repair    Surgeon: Fanny Benson MD    Patient Active Problem List   Diagnosis    [facility] disease ([facility] [address]    Hypertension    COPD (chronic obstructive pulmonary disease) ([facility] [address]    Arthritis    Hyperlipidemia    GERD (gastroesophageal reflux disease)    Anxiety and depression    Status post knee surgery    Hypotension    Arthritis of right knee       Subjective: Doing well, no new complaints, pain from surgery has resolved, tolerating diet, reflux resolved, bowel function normal, some sticking with thicker foods, carrots    Objective:  /78 (Site: Left Lower Arm, Position: Sitting, Cuff Size: Medium Adult)   Pulse 83   Temp 97 °F (36.1 °C) (Temporal)   Resp 18   Ht 5' 7\" (1.702 m)   Wt 244 lb (110.7 kg)   SpO2 96%   BMI 38.22 kg/m²     General appearance: AA, NAD  HEENT: NCAT, PERRLA, EOMI  Lungs: Clear, equal rise bilateral  Heart: Reg  Abdomen: soft, nondistended, nontender, incisions well healed, no signs of infection, no cellulitis, no hematoma  Ext: Atraumatic, no swelling  : No CVA tenderness    Assessment/Plan:  Jacqui Garzon is a 77 y.o. female 2 weeks s/p laparoscopic robot assisted paraesophageal hernia repair with partial fundoplication and myofascial flap. Doing well.     Resume activity gradually   Stop PPI therapy  No heavy lifting more than 20lbs for 4 weeks total  Pathology reviewed and copy provided  Follow up as needed    Physician Signature: Electronically signed by Dr. Fanny Benson  [phone] (p)  1/12/2022  10:16 AM

## 2022-09-01 ENCOUNTER — HOSPITAL ENCOUNTER (OUTPATIENT)
Dept: MAMMOGRAPHY | Age: 67
Discharge: HOME | End: 2022-09-03
Payer: MEDICARE

## 2022-09-01 VITALS — BODY MASS INDEX: 35.16 KG/M2 | HEIGHT: 67 IN | WEIGHT: 224 LBS

## 2022-09-01 DIAGNOSIS — Z12.31 ENCOUNTER FOR SCREENING MAMMOGRAM FOR MALIGNANT NEOPLASM OF BREAST: ICD-10-CM

## 2022-09-01 PROCEDURE — 77067 SCR MAMMO BI INCL CAD: CPT

## 2022-11-14 NOTE — PROGRESS NOTES
Yes, please update our office with BP and pulse readings weekly and or if symptomatic or concerns,.   For now continue same dose therapy.    alone  in a apartment  with Elevator  to enter   Tub shower grab bars    Equipment owned: [address] and Shower chair,       [address] [facility]   How much difficulty turning over in bed?: A Little  How much difficulty sitting down on / standing up from a chair with arms?: A Little  How much difficulty moving from lying on back to sitting on side of bed?: A Little  How much help from another person moving to and from a bed to a chair?: A Little  How much help from another person needed to walk in hospital room?: A Lot  How much help from another person for climbing 3-5 steps with a railing?: A Lot  AM-PAC Inpatient Mobility Raw Score : 16  AM-PAC Inpatient T-Scale Score : 40.78  Mobility Inpatient CMS 0-100% Score: 54.16  Mobility Inpatient CMS G-Code Modifier : CK    Nursing cleared patient for PT evaluation. The admitting diagnosis and active problem list as listed above have been reviewed prior to the initiation of this evaluation. OBJECTIVE:   Initial Evaluation  Date: 7/30/2020 Treatment Date: Short Term/ Long Term   Goals   Was pt agreeable to Eval/treatment? Yes     Pain Level  10/10  Right knee        Bed Mobility  Rolling: Minimal assist of 1    Supine to sit: Minimal assist of 1    Sit to supine: Minimal assist of 1    Scooting: Minimal assist of 1    Rolling: Independent    Supine to sit:  Independent    Sit to supine: Independent    Scooting: Independent     Transfers Sit to stand: Minimal assist of 1 bed elevated Cues for hand placement and safety   Sit to stand: Modified Independent     Ambulation    4 feet using  wheeled walker with Minimal assist of 1   cues for right knee extension in stance phase of gait, heel toe walk Left lower extremity   100 feet using  wheeled walker with Modified Independent    Stair negotiation: ascended and descended   Not assessed            ROM Within functional limits except Right knee 15-70°     Increase range of diagnosis, prognosis, and plan of care. PLAN:    Physical Therapy care will be provided in accordance with the objectives noted above. Exercises and functional mobility practice will be used as well as appropriate assistive devices or modalities to obtain goals. Patient and family education will also be administered as needed. Frequency of treatments: Patient will be seen  twice daily  for therapeutic exercise, functional retraining, endurance activities, balance exercises, family and patient education. Time in  1407  Time out  1437    Total Treatment Time  23 minutes    Evaluation time includes thorough review of current medical information, gathering information on past medical history/social history and prior level of function, completion of standardized testing/informal observation of tasks, assessment of data, and development of Plan of care and goals.      CPT codes:  Low Complexity PT evaluation (85132)  Therapeutic activities (88360)   15 minutes  1 unit(s)  Therapeutic exercises (37117)   8 minutes  1 unit(s)    Carlos Romero, PT

## 2023-03-22 ENCOUNTER — HOSPITAL ENCOUNTER (INPATIENT)
Age: 68
LOS: 5 days | Discharge: HOME | DRG: 193 | End: 2023-03-27
Attending: EMERGENCY MEDICINE | Admitting: INTERNAL MEDICINE
Payer: MEDICARE

## 2023-03-22 ENCOUNTER — APPOINTMENT (OUTPATIENT)
Dept: GENERAL RADIOLOGY | Age: 68
DRG: 193 | End: 2023-03-22
Payer: MEDICARE

## 2023-03-22 DIAGNOSIS — J96.01 ACUTE RESPIRATORY FAILURE WITH HYPOXIA (HCC): Primary | ICD-10-CM

## 2023-03-22 DIAGNOSIS — J18.9 PNEUMONIA OF RIGHT LOWER LOBE DUE TO INFECTIOUS ORGANISM: ICD-10-CM

## 2023-03-22 LAB
ALBUMIN SERPL-MCNC: 4.3 G/DL (ref 3.5–5.2)
ALP SERPL-CCNC: 84 U/L (ref 35–104)
ALT SERPL-CCNC: 15 U/L (ref 0–32)
ANION GAP SERPL CALCULATED.3IONS-SCNC: 14 MMOL/L (ref 7–16)
AST SERPL-CCNC: 21 U/L (ref 0–31)
BASOPHILS # BLD: 0.08 E9/L (ref 0–0.2)
BASOPHILS NFR BLD: 0.5 % (ref 0–2)
BILIRUB SERPL-MCNC: 0.4 MG/DL (ref 0–1.2)
BNP BLD-MCNC: 60 PG/ML (ref 0–125)
BUN SERPL-MCNC: 17 MG/DL (ref 6–23)
CALCIUM SERPL-MCNC: 9.6 MG/DL (ref 8.6–10.2)
CHLORIDE SERPL-SCNC: 99 MMOL/L (ref 98–107)
CO2 SERPL-SCNC: 26 MMOL/L (ref 22–29)
CREAT SERPL-MCNC: 0.9 MG/DL (ref 0.5–1)
CRP SERPL HS-MCNC: 1.8 MG/DL (ref 0–0.4)
D DIMER: 232 NG/ML DDU
EOSINOPHIL # BLD: 0.24 E9/L (ref 0.05–0.5)
EOSINOPHIL NFR BLD: 1.5 % (ref 0–6)
ERYTHROCYTE [DISTWIDTH] IN BLOOD BY AUTOMATED COUNT: 13.4 FL (ref 11.5–15)
GLUCOSE SERPL-MCNC: 97 MG/DL (ref 74–99)
HCT VFR BLD AUTO: 43.5 % (ref 34–48)
HGB BLD-MCNC: 13.3 G/DL (ref 11.5–15.5)
IMM GRANULOCYTES # BLD: 0.08 E9/L
IMM GRANULOCYTES NFR BLD: 0.5 % (ref 0–5)
INFLUENZA A BY PCR: NOT DETECTED
INFLUENZA B BY PCR: NOT DETECTED
LYMPHOCYTES # BLD: 0.94 E9/L (ref 1.5–4)
LYMPHOCYTES NFR BLD: 5.9 % (ref 20–42)
MAGNESIUM SERPL-MCNC: 2.1 MG/DL (ref 1.6–2.6)
MCH RBC QN AUTO: 29.4 PG (ref 26–35)
MCHC RBC AUTO-ENTMCNC: 30.6 % (ref 32–34.5)
MCV RBC AUTO: 96 FL (ref 80–99.9)
MONOCYTES # BLD: 0.77 E9/L (ref 0.1–0.95)
MONOCYTES NFR BLD: 4.8 % (ref 2–12)
NEUTROPHILS # BLD: 13.78 E9/L (ref 1.8–7.3)
NEUTS SEG NFR BLD: 86.8 % (ref 43–80)
PLATELET # BLD AUTO: 474 E9/L (ref 130–450)
PMV BLD AUTO: 8.9 FL (ref 7–12)
POTASSIUM SERPL-SCNC: 4.1 MMOL/L (ref 3.5–5)
PROCALCITONIN: 0.05 NG/ML (ref 0–0.08)
PROT SERPL-MCNC: 8.5 G/DL (ref 6.4–8.3)
RBC # BLD AUTO: 4.53 E12/L (ref 3.5–5.5)
SARS-COV-2 RDRP RESP QL NAA+PROBE: NOT DETECTED
SODIUM SERPL-SCNC: 139 MMOL/L (ref 132–146)
TROPONIN, HIGH SENSITIVITY: 8 NG/L (ref 0–9)
WBC # BLD: 15.9 E9/L (ref 4.5–11.5)

## 2023-03-22 PROCEDURE — 83880 ASSAY OF NATRIURETIC PEPTIDE: CPT

## 2023-03-22 PROCEDURE — 80053 COMPREHEN METABOLIC PANEL: CPT

## 2023-03-22 PROCEDURE — 2580000003 HC RX 258: Performed by: STUDENT IN AN ORGANIZED HEALTH CARE EDUCATION/TRAINING PROGRAM

## 2023-03-22 PROCEDURE — 87040 BLOOD CULTURE FOR BACTERIA: CPT

## 2023-03-22 PROCEDURE — 84145 PROCALCITONIN (PCT): CPT

## 2023-03-22 PROCEDURE — 6370000000 HC RX 637 (ALT 250 FOR IP): Performed by: INTERNAL MEDICINE

## 2023-03-22 PROCEDURE — 85025 COMPLETE CBC W/AUTO DIFF WBC: CPT

## 2023-03-22 PROCEDURE — 87635 SARS-COV-2 COVID-19 AMP PRB: CPT

## 2023-03-22 PROCEDURE — 2580000003 HC RX 258: Performed by: INTERNAL MEDICINE

## 2023-03-22 PROCEDURE — 94640 AIRWAY INHALATION TREATMENT: CPT

## 2023-03-22 PROCEDURE — 83735 ASSAY OF MAGNESIUM: CPT

## 2023-03-22 PROCEDURE — 96374 THER/PROPH/DIAG INJ IV PUSH: CPT

## 2023-03-22 PROCEDURE — 1200000000 HC SEMI PRIVATE

## 2023-03-22 PROCEDURE — 71046 X-RAY EXAM CHEST 2 VIEWS: CPT

## 2023-03-22 PROCEDURE — 96375 TX/PRO/DX INJ NEW DRUG ADDON: CPT

## 2023-03-22 PROCEDURE — 93005 ELECTROCARDIOGRAM TRACING: CPT | Performed by: STUDENT IN AN ORGANIZED HEALTH CARE EDUCATION/TRAINING PROGRAM

## 2023-03-22 PROCEDURE — 85378 FIBRIN DEGRADE SEMIQUANT: CPT

## 2023-03-22 PROCEDURE — 86140 C-REACTIVE PROTEIN: CPT

## 2023-03-22 PROCEDURE — 2500000003 HC RX 250 WO HCPCS: Performed by: STUDENT IN AN ORGANIZED HEALTH CARE EDUCATION/TRAINING PROGRAM

## 2023-03-22 PROCEDURE — 36415 COLL VENOUS BLD VENIPUNCTURE: CPT

## 2023-03-22 PROCEDURE — 84484 ASSAY OF TROPONIN QUANT: CPT

## 2023-03-22 PROCEDURE — 0202U NFCT DS 22 TRGT SARS-COV-2: CPT

## 2023-03-22 PROCEDURE — 6360000002 HC RX W HCPCS: Performed by: STUDENT IN AN ORGANIZED HEALTH CARE EDUCATION/TRAINING PROGRAM

## 2023-03-22 PROCEDURE — 87502 INFLUENZA DNA AMP PROBE: CPT

## 2023-03-22 PROCEDURE — 99285 EMERGENCY DEPT VISIT HI MDM: CPT

## 2023-03-22 RX ORDER — HYDROXYZINE PAMOATE 25 MG/1
25 CAPSULE ORAL DAILY
COMMUNITY

## 2023-03-22 RX ORDER — HYDROXYZINE PAMOATE 25 MG/1
25 CAPSULE ORAL 3 TIMES DAILY PRN
Status: DISCONTINUED | OUTPATIENT
Start: 2023-03-22 | End: 2023-03-27 | Stop reason: HOSPADM

## 2023-03-22 RX ORDER — ACETAMINOPHEN 650 MG/1
650 SUPPOSITORY RECTAL EVERY 6 HOURS PRN
Status: DISCONTINUED | OUTPATIENT
Start: 2023-03-22 | End: 2023-03-27 | Stop reason: HOSPADM

## 2023-03-22 RX ORDER — METHOCARBAMOL 500 MG/1
500 TABLET, FILM COATED ORAL 4 TIMES DAILY PRN
Status: DISCONTINUED | OUTPATIENT
Start: 2023-03-22 | End: 2023-03-27 | Stop reason: HOSPADM

## 2023-03-22 RX ORDER — IPRATROPIUM BROMIDE AND ALBUTEROL SULFATE 2.5; .5 MG/3ML; MG/3ML
1 SOLUTION RESPIRATORY (INHALATION)
Status: DISCONTINUED | OUTPATIENT
Start: 2023-03-22 | End: 2023-03-27 | Stop reason: HOSPADM

## 2023-03-22 RX ORDER — GLYCOPYRROLATE 1 MG/1
2 TABLET ORAL 2 TIMES DAILY
Status: CANCELLED | OUTPATIENT
Start: 2023-03-22

## 2023-03-22 RX ORDER — SODIUM CHLORIDE 9 MG/ML
INJECTION, SOLUTION INTRAVENOUS PRN
Status: DISCONTINUED | OUTPATIENT
Start: 2023-03-22 | End: 2023-03-27 | Stop reason: HOSPADM

## 2023-03-22 RX ORDER — ATORVASTATIN CALCIUM 20 MG/1
20 TABLET, FILM COATED ORAL DAILY
Status: DISCONTINUED | OUTPATIENT
Start: 2023-03-23 | End: 2023-03-27 | Stop reason: HOSPADM

## 2023-03-22 RX ORDER — POTASSIUM CHLORIDE 1500 MG/1
20 TABLET, FILM COATED, EXTENDED RELEASE ORAL
Status: DISCONTINUED | OUTPATIENT
Start: 2023-03-23 | End: 2023-03-22 | Stop reason: CLARIF

## 2023-03-22 RX ORDER — ALBUTEROL SULFATE 2.5 MG/3ML
2.5 SOLUTION RESPIRATORY (INHALATION) EVERY 6 HOURS PRN
Status: DISCONTINUED | OUTPATIENT
Start: 2023-03-22 | End: 2023-03-27 | Stop reason: HOSPADM

## 2023-03-22 RX ORDER — LISINOPRIL 10 MG/1
10 TABLET ORAL DAILY
Status: DISCONTINUED | OUTPATIENT
Start: 2023-03-23 | End: 2023-03-27 | Stop reason: HOSPADM

## 2023-03-22 RX ORDER — FLUTICASONE PROPIONATE 110 UG/1
1 AEROSOL, METERED RESPIRATORY (INHALATION) 2 TIMES DAILY
Status: DISCONTINUED | OUTPATIENT
Start: 2023-03-22 | End: 2023-03-27 | Stop reason: HOSPADM

## 2023-03-22 RX ORDER — SODIUM CHLORIDE 0.9 % (FLUSH) 0.9 %
5-40 SYRINGE (ML) INJECTION EVERY 12 HOURS SCHEDULED
Status: DISCONTINUED | OUTPATIENT
Start: 2023-03-22 | End: 2023-03-27 | Stop reason: HOSPADM

## 2023-03-22 RX ORDER — PAROXETINE HYDROCHLORIDE HEMIHYDRATE 25 MG/1
25 TABLET, FILM COATED, EXTENDED RELEASE ORAL EVERY MORNING
COMMUNITY

## 2023-03-22 RX ORDER — HYDROXYZINE PAMOATE 25 MG/1
50 CAPSULE ORAL EVERY EVENING
COMMUNITY

## 2023-03-22 RX ORDER — SPIRONOLACTONE 25 MG/1
25 TABLET ORAL 2 TIMES DAILY
Status: DISCONTINUED | OUTPATIENT
Start: 2023-03-22 | End: 2023-03-27 | Stop reason: HOSPADM

## 2023-03-22 RX ORDER — ACETAMINOPHEN 500 MG
500 TABLET ORAL EVERY 6 HOURS PRN
Status: DISCONTINUED | OUTPATIENT
Start: 2023-03-22 | End: 2023-03-27 | Stop reason: HOSPADM

## 2023-03-22 RX ORDER — POLYETHYLENE GLYCOL 3350 17 G/17G
17 POWDER, FOR SOLUTION ORAL DAILY PRN
Status: DISCONTINUED | OUTPATIENT
Start: 2023-03-22 | End: 2023-03-27 | Stop reason: HOSPADM

## 2023-03-22 RX ORDER — ONDANSETRON 4 MG/1
4 TABLET, ORALLY DISINTEGRATING ORAL EVERY 8 HOURS PRN
Status: DISCONTINUED | OUTPATIENT
Start: 2023-03-22 | End: 2023-03-27 | Stop reason: HOSPADM

## 2023-03-22 RX ORDER — DEXTROSE MONOHYDRATE 100 MG/ML
INJECTION, SOLUTION INTRAVENOUS CONTINUOUS PRN
Status: DISCONTINUED | OUTPATIENT
Start: 2023-03-22 | End: 2023-03-27 | Stop reason: HOSPADM

## 2023-03-22 RX ORDER — ACYCLOVIR 400 MG/1
800 TABLET ORAL DAILY
Status: DISCONTINUED | OUTPATIENT
Start: 2023-03-23 | End: 2023-03-27 | Stop reason: HOSPADM

## 2023-03-22 RX ORDER — POTASSIUM CHLORIDE 20 MEQ/1
20 TABLET, EXTENDED RELEASE ORAL
Status: DISCONTINUED | OUTPATIENT
Start: 2023-03-23 | End: 2023-03-27 | Stop reason: HOSPADM

## 2023-03-22 RX ORDER — ACETAMINOPHEN 160 MG
1 TABLET,DISINTEGRATING ORAL DAILY
Status: CANCELLED | OUTPATIENT
Start: 2023-03-22

## 2023-03-22 RX ORDER — SODIUM CHLORIDE 0.9 % (FLUSH) 0.9 %
5-40 SYRINGE (ML) INJECTION PRN
Status: DISCONTINUED | OUTPATIENT
Start: 2023-03-22 | End: 2023-03-27 | Stop reason: HOSPADM

## 2023-03-22 RX ORDER — ONDANSETRON 2 MG/ML
4 INJECTION INTRAMUSCULAR; INTRAVENOUS EVERY 6 HOURS PRN
Status: DISCONTINUED | OUTPATIENT
Start: 2023-03-22 | End: 2023-03-27 | Stop reason: HOSPADM

## 2023-03-22 RX ORDER — AZITHROMYCIN 250 MG/1
500 TABLET, FILM COATED ORAL DAILY
Status: COMPLETED | OUTPATIENT
Start: 2023-03-23 | End: 2023-03-27

## 2023-03-22 RX ORDER — PANTOPRAZOLE SODIUM 40 MG/1
40 TABLET, DELAYED RELEASE ORAL
Status: DISCONTINUED | OUTPATIENT
Start: 2023-03-23 | End: 2023-03-27 | Stop reason: HOSPADM

## 2023-03-22 RX ORDER — MAGNESIUM OXIDE 400 MG/1
400 TABLET ORAL DAILY
Status: DISCONTINUED | OUTPATIENT
Start: 2023-03-23 | End: 2023-03-27 | Stop reason: HOSPADM

## 2023-03-22 RX ORDER — CETIRIZINE HYDROCHLORIDE 10 MG/1
5 TABLET ORAL DAILY
Status: DISCONTINUED | OUTPATIENT
Start: 2023-03-23 | End: 2023-03-27 | Stop reason: HOSPADM

## 2023-03-22 RX ORDER — LANOLIN ALCOHOL/MO/W.PET/CERES
1000 CREAM (GRAM) TOPICAL DAILY
Status: DISCONTINUED | OUTPATIENT
Start: 2023-03-23 | End: 2023-03-27 | Stop reason: HOSPADM

## 2023-03-22 RX ORDER — VITAMIN E 268 MG
400 CAPSULE ORAL DAILY
COMMUNITY

## 2023-03-22 RX ORDER — MESALAMINE 400 MG/1
800 CAPSULE, DELAYED RELEASE ORAL 2 TIMES DAILY
Status: DISCONTINUED | OUTPATIENT
Start: 2023-03-22 | End: 2023-03-27 | Stop reason: HOSPADM

## 2023-03-22 RX ORDER — ACETAMINOPHEN 325 MG/1
650 TABLET ORAL EVERY 6 HOURS PRN
Status: DISCONTINUED | OUTPATIENT
Start: 2023-03-22 | End: 2023-03-27 | Stop reason: HOSPADM

## 2023-03-22 RX ORDER — ALBUTEROL SULFATE 2.5 MG/3ML
2.5 SOLUTION RESPIRATORY (INHALATION) EVERY 6 HOURS PRN
COMMUNITY

## 2023-03-22 RX ORDER — NAPROXEN 250 MG/1
250 TABLET ORAL NIGHTLY
COMMUNITY

## 2023-03-22 RX ORDER — HYDROXYZINE HYDROCHLORIDE 25 MG/1
25 TABLET, FILM COATED ORAL 2 TIMES DAILY
Status: DISCONTINUED | OUTPATIENT
Start: 2023-03-22 | End: 2023-03-27 | Stop reason: HOSPADM

## 2023-03-22 RX ORDER — FLUTICASONE PROPIONATE AND SALMETEROL 500; 50 UG/1; UG/1
1 POWDER RESPIRATORY (INHALATION) EVERY 12 HOURS
COMMUNITY

## 2023-03-22 RX ORDER — VITAMIN E 268 MG
400 CAPSULE ORAL 2 TIMES DAILY
Status: DISCONTINUED | OUTPATIENT
Start: 2023-03-22 | End: 2023-03-27 | Stop reason: HOSPADM

## 2023-03-22 RX ORDER — ALBUTEROL SULFATE 90 UG/1
2 AEROSOL, METERED RESPIRATORY (INHALATION) EVERY 6 HOURS PRN
Status: DISCONTINUED | OUTPATIENT
Start: 2023-03-22 | End: 2023-03-22

## 2023-03-22 RX ORDER — MECOBALAMIN 5000 MCG
5 TABLET,DISINTEGRATING ORAL NIGHTLY PRN
Status: DISCONTINUED | OUTPATIENT
Start: 2023-03-22 | End: 2023-03-27 | Stop reason: HOSPADM

## 2023-03-22 RX ORDER — BUDESONIDE 0.5 MG/2ML
0.5 INHALANT ORAL 2 TIMES DAILY
Status: DISCONTINUED | OUTPATIENT
Start: 2023-03-22 | End: 2023-03-22 | Stop reason: ALTCHOICE

## 2023-03-22 RX ORDER — ENOXAPARIN SODIUM 100 MG/ML
40 INJECTION SUBCUTANEOUS DAILY
Status: DISCONTINUED | OUTPATIENT
Start: 2023-03-23 | End: 2023-03-24 | Stop reason: DRUGHIGH

## 2023-03-22 RX ADMIN — DOXYCYCLINE 100 MG: 100 INJECTION, POWDER, LYOPHILIZED, FOR SOLUTION INTRAVENOUS at 15:13

## 2023-03-22 RX ADMIN — Medication 400 UNITS: at 22:19

## 2023-03-22 RX ADMIN — SPIRONOLACTONE 25 MG: 25 TABLET ORAL at 22:19

## 2023-03-22 RX ADMIN — MESALAMINE 800 MG: 400 CAPSULE, DELAYED RELEASE ORAL at 22:18

## 2023-03-22 RX ADMIN — Medication 10 ML: at 22:18

## 2023-03-22 RX ADMIN — HYDROXYZINE HYDROCHLORIDE 25 MG: 25 TABLET ORAL at 22:19

## 2023-03-22 RX ADMIN — IPRATROPIUM BROMIDE AND ALBUTEROL SULFATE 1 AMPULE: .5; 2.5 SOLUTION RESPIRATORY (INHALATION) at 21:27

## 2023-03-22 RX ADMIN — CEFTRIAXONE SODIUM 2000 MG: 2 INJECTION, POWDER, FOR SOLUTION INTRAMUSCULAR; INTRAVENOUS at 15:13

## 2023-03-22 ASSESSMENT — PAIN - FUNCTIONAL ASSESSMENT
PAIN_FUNCTIONAL_ASSESSMENT: NONE - DENIES PAIN

## 2023-03-22 NOTE — ED PROVIDER NOTES
dictation. ED Course as of 03/23/23 0603   Wed Mar 22, 2023   1240 Patient was 84% on room air initially during nursing evaluation, she was placed on 3 L nasal cannula. [PW]   5213 WBC(!): 15.9 [PW]   1334 Mild leukocytosis, no anemia present. [PW]   1351 WBC(!): 15.9 [PW]   8962 SARS-CoV-2, NAAT: Not Detected [PW]   0406 Influenza A by PCR: Not Detected [PW]   8852 XR CHEST (2 VW)  IMPRESSION:  No acute process. [PW]   1406 XR CHEST (2 VW)  Right lower lobe infiltrate consistent with pneumonia. [PW]   [id number] Spoke with Dr. Nuha Swain, will admit for further care.  [PW]      ED Course User Index  [PW] Danish Michael,           Medications given include:  Medications   vitamin B-12 (CYANOCOBALAMIN) tablet 1,000 mcg (has no administration in time range)   hydrOXYzine HCl (ATARAX) tablet 25 mg (25 mg Oral Given 3/22/23 2219)   PARoxetine (PAXIL) tablet 25 mg (has no administration in time range)   acyclovir (ZOVIRAX) tablet 800 mg (has no administration in time range)   lisinopril (PRINIVIL;ZESTRIL) tablet 10 mg (has no administration in time range)   spironolactone (ALDACTONE) tablet 25 mg (25 mg Oral Given 3/22/23 2219)   melatonin disintegrating tablet 5 mg (has no administration in time range)   magnesium oxide (MAG-OX) tablet 400 mg (has no administration in time range)   hydrOXYzine pamoate (VISTARIL) capsule 25 mg (has no administration in time range)   vitamin E capsule 400 Units (400 Units Oral Given 3/22/23 2219)   ondansetron (ZOFRAN-ODT) disintegrating tablet 4 mg (has no administration in time range)   methocarbamol (ROBAXIN) tablet 500 mg (has no administration in time range)   cetirizine (ZYRTEC) tablet 5 mg (has no administration in time range)   pantoprazole (PROTONIX) tablet 40 mg (40 mg Oral Not Given 3/23/23 0510)   mesalamine (DELZICOL) delayed release capsule 800 mg (800 mg Oral Given 3/22/23 2218)   atorvastatin (LIPITOR) tablet 20 mg (has no administration in time range)   enoxaparin (LOVENOX)

## 2023-03-23 ENCOUNTER — APPOINTMENT (OUTPATIENT)
Dept: CT IMAGING | Age: 68
DRG: 193 | End: 2023-03-23
Payer: MEDICARE

## 2023-03-23 LAB
ALBUMIN SERPL-MCNC: 3.6 G/DL (ref 3.5–5.2)
ALP SERPL-CCNC: 68 U/L (ref 35–104)
ALT SERPL-CCNC: 10 U/L (ref 0–32)
ANION GAP SERPL CALCULATED.3IONS-SCNC: 11 MMOL/L (ref 7–16)
AST SERPL-CCNC: 17 U/L (ref 0–31)
B PARAP IS1001 DNA NPH QL NAA+NON-PROBE: NOT DETECTED
B PERT.PT PRMT NPH QL NAA+NON-PROBE: NOT DETECTED
BACTERIA URNS QL MICRO: NORMAL /HPF
BASOPHILS # BLD: 0.04 E9/L (ref 0–0.2)
BASOPHILS NFR BLD: 0.4 % (ref 0–2)
BILIRUB SERPL-MCNC: 0.4 MG/DL (ref 0–1.2)
BILIRUB UR QL STRIP: NEGATIVE
BUN SERPL-MCNC: 15 MG/DL (ref 6–23)
C PNEUM DNA NPH QL NAA+NON-PROBE: NOT DETECTED
CALCIUM SERPL-MCNC: 8.6 MG/DL (ref 8.6–10.2)
CHLORIDE SERPL-SCNC: 102 MMOL/L (ref 98–107)
CHOLESTEROL, TOTAL: 131 MG/DL (ref 0–199)
CLARITY UR: CLEAR
CO2 SERPL-SCNC: 25 MMOL/L (ref 22–29)
COLOR UR: YELLOW
CREAT SERPL-MCNC: 0.9 MG/DL (ref 0.5–1)
EKG ATRIAL RATE: 85 BPM
EKG P AXIS: 117 DEGREES
EKG P-R INTERVAL: 128 MS
EKG Q-T INTERVAL: 380 MS
EKG QRS DURATION: 88 MS
EKG QTC CALCULATION (BAZETT): 452 MS
EKG R AXIS: 149 DEGREES
EKG T AXIS: 117 DEGREES
EKG VENTRICULAR RATE: 85 BPM
EOSINOPHIL # BLD: 0.32 E9/L (ref 0.05–0.5)
EOSINOPHIL NFR BLD: 3.5 % (ref 0–6)
ERYTHROCYTE [DISTWIDTH] IN BLOOD BY AUTOMATED COUNT: 13.8 FL (ref 11.5–15)
FLUAV RNA NPH QL NAA+NON-PROBE: NOT DETECTED
FLUBV RNA NPH QL NAA+NON-PROBE: NOT DETECTED
GLUCOSE SERPL-MCNC: 91 MG/DL (ref 74–99)
GLUCOSE UR STRIP-MCNC: NEGATIVE MG/DL
HADV DNA NPH QL NAA+NON-PROBE: NOT DETECTED
HCOV 229E RNA NPH QL NAA+NON-PROBE: NOT DETECTED
HCOV HKU1 RNA NPH QL NAA+NON-PROBE: NOT DETECTED
HCOV NL63 RNA NPH QL NAA+NON-PROBE: NOT DETECTED
HCOV OC43 RNA NPH QL NAA+NON-PROBE: NOT DETECTED
HCT VFR BLD AUTO: 35.2 % (ref 34–48)
HDLC SERPL-MCNC: 46 MG/DL
HGB BLD-MCNC: 11.3 G/DL (ref 11.5–15.5)
HGB UR QL STRIP: NEGATIVE
HMPV RNA NPH QL NAA+NON-PROBE: NOT DETECTED
HPIV1 RNA NPH QL NAA+NON-PROBE: NOT DETECTED
HPIV2 RNA NPH QL NAA+NON-PROBE: NOT DETECTED
HPIV3 RNA NPH QL NAA+NON-PROBE: NOT DETECTED
HPIV4 RNA NPH QL NAA+NON-PROBE: NOT DETECTED
IMM GRANULOCYTES # BLD: 0.05 E9/L
IMM GRANULOCYTES NFR BLD: 0.5 % (ref 0–5)
KETONES UR STRIP-MCNC: NEGATIVE MG/DL
LDLC SERPL CALC-MCNC: 67 MG/DL (ref 0–99)
LEGIONELLA AG UR QL: NORMAL
LEUKOCYTE ESTERASE UR QL STRIP: NEGATIVE
LYMPHOCYTES # BLD: 1.52 E9/L (ref 1.5–4)
LYMPHOCYTES NFR BLD: 16.5 % (ref 20–42)
M PNEUMO DNA NPH QL NAA+NON-PROBE: NOT DETECTED
MAGNESIUM SERPL-MCNC: 2.1 MG/DL (ref 1.6–2.6)
MCH RBC QN AUTO: 31 PG (ref 26–35)
MCHC RBC AUTO-ENTMCNC: 32.1 % (ref 32–34.5)
MCV RBC AUTO: 96.4 FL (ref 80–99.9)
METER GLUCOSE: 102 MG/DL (ref 74–99)
METER GLUCOSE: 109 MG/DL (ref 74–99)
MONOCYTES # BLD: 0.74 E9/L (ref 0.1–0.95)
MONOCYTES NFR BLD: 8 % (ref 2–12)
NEUTROPHILS # BLD: 6.57 E9/L (ref 1.8–7.3)
NEUTS SEG NFR BLD: 71.1 % (ref 43–80)
NITRITE UR QL STRIP: NEGATIVE
PH UR STRIP: 5.5 [PH] (ref 5–9)
PLATELET # BLD AUTO: 349 E9/L (ref 130–450)
PMV BLD AUTO: 9 FL (ref 7–12)
POTASSIUM SERPL-SCNC: 4 MMOL/L (ref 3.5–5)
PROT SERPL-MCNC: 7.1 G/DL (ref 6.4–8.3)
PROT UR STRIP-MCNC: NEGATIVE MG/DL
RBC # BLD AUTO: 3.65 E12/L (ref 3.5–5.5)
RBC #/AREA URNS HPF: NORMAL /HPF (ref 0–2)
RSV RNA NPH QL NAA+NON-PROBE: NOT DETECTED
RV+EV RNA NPH QL NAA+NON-PROBE: NOT DETECTED
S PNEUM AG SPEC QL: NORMAL
SARS-COV-2 RNA NPH QL NAA+NON-PROBE: NOT DETECTED
SODIUM SERPL-SCNC: 138 MMOL/L (ref 132–146)
SP GR UR STRIP: 1.02 (ref 1–1.03)
TRIGL SERPL-MCNC: 92 MG/DL (ref 0–149)
TSH SERPL-MCNC: 1.27 UIU/ML (ref 0.27–4.2)
UROBILINOGEN UR STRIP-ACNC: 0.2 E.U./DL
VLDLC SERPL CALC-MCNC: 18 MG/DL
WBC # BLD: 9.2 E9/L (ref 4.5–11.5)
WBC #/AREA URNS HPF: NORMAL /HPF (ref 0–5)

## 2023-03-23 PROCEDURE — 6370000000 HC RX 637 (ALT 250 FOR IP): Performed by: INTERNAL MEDICINE

## 2023-03-23 PROCEDURE — 80061 LIPID PANEL: CPT

## 2023-03-23 PROCEDURE — 81001 URINALYSIS AUTO W/SCOPE: CPT

## 2023-03-23 PROCEDURE — 2700000000 HC OXYGEN THERAPY PER DAY

## 2023-03-23 PROCEDURE — 2580000003 HC RX 258: Performed by: INTERNAL MEDICINE

## 2023-03-23 PROCEDURE — 71250 CT THORAX DX C-: CPT

## 2023-03-23 PROCEDURE — 85025 COMPLETE CBC W/AUTO DIFF WBC: CPT

## 2023-03-23 PROCEDURE — 84443 ASSAY THYROID STIM HORMONE: CPT

## 2023-03-23 PROCEDURE — 6360000002 HC RX W HCPCS: Performed by: INTERNAL MEDICINE

## 2023-03-23 PROCEDURE — 87449 NOS EACH ORGANISM AG IA: CPT

## 2023-03-23 PROCEDURE — 94640 AIRWAY INHALATION TREATMENT: CPT

## 2023-03-23 PROCEDURE — 82962 GLUCOSE BLOOD TEST: CPT

## 2023-03-23 PROCEDURE — 94660 CPAP INITIATION&MGMT: CPT

## 2023-03-23 PROCEDURE — 83735 ASSAY OF MAGNESIUM: CPT

## 2023-03-23 PROCEDURE — 1200000000 HC SEMI PRIVATE

## 2023-03-23 PROCEDURE — 36415 COLL VENOUS BLD VENIPUNCTURE: CPT

## 2023-03-23 PROCEDURE — 93010 ELECTROCARDIOGRAM REPORT: CPT | Performed by: INTERNAL MEDICINE

## 2023-03-23 PROCEDURE — 80053 COMPREHEN METABOLIC PANEL: CPT

## 2023-03-23 RX ORDER — GUAIFENESIN/DEXTROMETHORPHAN 100-10MG/5
10 SYRUP ORAL EVERY 4 HOURS PRN
Status: DISCONTINUED | OUTPATIENT
Start: 2023-03-23 | End: 2023-03-27 | Stop reason: HOSPADM

## 2023-03-23 RX ORDER — PREDNISONE 20 MG/1
40 TABLET ORAL DAILY
Status: DISCONTINUED | OUTPATIENT
Start: 2023-03-23 | End: 2023-03-27 | Stop reason: HOSPADM

## 2023-03-23 RX ADMIN — HYDROXYZINE HYDROCHLORIDE 25 MG: 25 TABLET ORAL at 22:50

## 2023-03-23 RX ADMIN — ACYCLOVIR 800 MG: 400 TABLET ORAL at 09:36

## 2023-03-23 RX ADMIN — CYANOCOBALAMIN TAB 1000 MCG 1000 MCG: 1000 TAB at 09:37

## 2023-03-23 RX ADMIN — Medication 400 UNITS: at 09:37

## 2023-03-23 RX ADMIN — MESALAMINE 800 MG: 400 CAPSULE, DELAYED RELEASE ORAL at 18:16

## 2023-03-23 RX ADMIN — IPRATROPIUM BROMIDE AND ALBUTEROL SULFATE 1 AMPULE: .5; 2.5 SOLUTION RESPIRATORY (INHALATION) at 06:16

## 2023-03-23 RX ADMIN — IPRATROPIUM BROMIDE AND ALBUTEROL SULFATE 1 AMPULE: .5; 2.5 SOLUTION RESPIRATORY (INHALATION) at 10:03

## 2023-03-23 RX ADMIN — ENOXAPARIN SODIUM 40 MG: 100 INJECTION SUBCUTANEOUS at 09:39

## 2023-03-23 RX ADMIN — PAROXETINE HYDROCHLORIDE 25 MG: 20 TABLET, FILM COATED ORAL at 11:46

## 2023-03-23 RX ADMIN — Medication 10 ML: at 22:51

## 2023-03-23 RX ADMIN — HYDROXYZINE HYDROCHLORIDE 25 MG: 25 TABLET ORAL at 09:36

## 2023-03-23 RX ADMIN — MAGNESIUM OXIDE 400 MG: 400 TABLET ORAL at 09:35

## 2023-03-23 RX ADMIN — HYDROXYZINE PAMOATE 25 MG: 25 CAPSULE ORAL at 09:37

## 2023-03-23 RX ADMIN — IPRATROPIUM BROMIDE AND ALBUTEROL SULFATE 1 AMPULE: .5; 2.5 SOLUTION RESPIRATORY (INHALATION) at 17:59

## 2023-03-23 RX ADMIN — LISINOPRIL 10 MG: 10 TABLET ORAL at 09:36

## 2023-03-23 RX ADMIN — Medication 400 UNITS: at 22:51

## 2023-03-23 RX ADMIN — MESALAMINE 800 MG: 400 CAPSULE, DELAYED RELEASE ORAL at 11:46

## 2023-03-23 RX ADMIN — Medication 10 ML: at 09:39

## 2023-03-23 RX ADMIN — CEFTRIAXONE 1000 MG: 1 INJECTION, POWDER, FOR SOLUTION INTRAMUSCULAR; INTRAVENOUS at 14:41

## 2023-03-23 RX ADMIN — POTASSIUM CHLORIDE 20 MEQ: 1500 TABLET, EXTENDED RELEASE ORAL at 09:36

## 2023-03-23 RX ADMIN — GUAIFENESIN AND DEXTROMETHORPHAN 10 ML: 100; 10 SYRUP ORAL at 16:09

## 2023-03-23 RX ADMIN — IPRATROPIUM BROMIDE AND ALBUTEROL SULFATE 1 AMPULE: .5; 2.5 SOLUTION RESPIRATORY (INHALATION) at 14:21

## 2023-03-23 RX ADMIN — ACETAMINOPHEN 650 MG: 325 TABLET ORAL at 22:51

## 2023-03-23 RX ADMIN — AZITHROMYCIN MONOHYDRATE 500 MG: 250 TABLET ORAL at 09:37

## 2023-03-23 RX ADMIN — SPIRONOLACTONE 25 MG: 25 TABLET ORAL at 09:36

## 2023-03-23 RX ADMIN — ATORVASTATIN CALCIUM 20 MG: 20 TABLET, FILM COATED ORAL at 09:36

## 2023-03-23 RX ADMIN — GUAIFENESIN AND DEXTROMETHORPHAN 10 ML: 100; 10 SYRUP ORAL at 22:50

## 2023-03-23 RX ADMIN — PREDNISONE 40 MG: 20 TABLET ORAL at 09:36

## 2023-03-23 RX ADMIN — CETIRIZINE HYDROCHLORIDE 5 MG: 10 TABLET, FILM COATED ORAL at 09:36

## 2023-03-23 ASSESSMENT — PAIN SCALES - GENERAL
PAINLEVEL_OUTOF10: 4
PAINLEVEL_OUTOF10: 0

## 2023-03-23 ASSESSMENT — PAIN DESCRIPTION - DESCRIPTORS: DESCRIPTORS: ACHING

## 2023-03-23 ASSESSMENT — PAIN DESCRIPTION - LOCATION: LOCATION: HEAD

## 2023-03-23 NOTE — H&P
BIOPSY      TOTAL KNEE ARTHROPLASTY      Left Knee    TOTAL KNEE ARTHROPLASTY Right 07/30/2020    RIGHT TOTAL KNEE ARTHROPLASTY (DEPUY) performed by Dmitry Aceves DO at [address] [facility] ENDOSCOPY N/A 12/06/2021    EGD BIOPSY performed by Yan Neely MD at [address] Hx:  Family History   Problem Relation Age of Onset    Ovarian Cancer Mother     Cancer Mother     Heart Disease Father     Ovarian Cancer Sister     Cancer Sister     Heart Disease Brother        HOME MEDICATIONS:  Prior to Admission medications    Medication Sig Start Date End Date Taking?  Authorizing Provider   albuterol (PROVENTIL) (2.5 MG/3ML) 0.083% nebulizer solution Take 2.5 mg by nebulization every 6 hours as needed for Wheezing   Yes Historical Provider, MD   vitamin E 400 UNIT capsule Take 400 Units by mouth daily   Yes Historical Provider, MD   hydrOXYzine pamoate (VISTARIL) 25 MG capsule Take 50 mg by mouth every evening   Yes Historical Provider, MD   hydrOXYzine pamoate (VISTARIL) 25 MG capsule Take 25 mg by mouth daily   Yes Historical Provider, MD   naproxen (NAPROSYN) 250 MG tablet Take 250 mg by mouth nightly   Yes Historical Provider, MD   PARoxetine (PAXIL CR) 25 MG extended release tablet Take 25 mg by mouth every morning   Yes Historical Provider, MD   fluticasone-salmeterol (ADVAIR DISKUS) 500-50 MCG/ACT AEPB diskus inhaler Inhale 1 puff into the lungs every 12 hours   Yes Historical Provider, MD   VITAMIN A PO Take 1 tablet by mouth daily   Yes Historical Provider, MD   CPAP Machine MISC by Does not apply route nightly   Yes Historical Provider, MD   hydrOXYzine (VISTARIL) 25 MG capsule Take 25 mg by mouth daily as needed for Anxiety    Historical Provider, MD   loratadine (CLARITIN) 10 MG tablet Take 10 mg by mouth daily  10/29/21   Historical Provider, MD   potassium chloride (KLOR-CON M) 20 MEQ TBCR extended release tablet Take 20 mEq by

## 2023-03-24 LAB
ANION GAP SERPL CALCULATED.3IONS-SCNC: 8 MMOL/L (ref 7–16)
BASOPHILS # BLD: 0.03 E9/L (ref 0–0.2)
BASOPHILS NFR BLD: 0.4 % (ref 0–2)
BUN SERPL-MCNC: 17 MG/DL (ref 6–23)
CALCIUM SERPL-MCNC: 8.9 MG/DL (ref 8.6–10.2)
CHLORIDE SERPL-SCNC: 103 MMOL/L (ref 98–107)
CO2 SERPL-SCNC: 25 MMOL/L (ref 22–29)
CREAT SERPL-MCNC: 0.8 MG/DL (ref 0.5–1)
EOSINOPHIL # BLD: 0.03 E9/L (ref 0.05–0.5)
EOSINOPHIL NFR BLD: 0.4 % (ref 0–6)
ERYTHROCYTE [DISTWIDTH] IN BLOOD BY AUTOMATED COUNT: 13.2 FL (ref 11.5–15)
GLUCOSE SERPL-MCNC: 99 MG/DL (ref 74–99)
HCT VFR BLD AUTO: 36 % (ref 34–48)
HGB BLD-MCNC: 11.4 G/DL (ref 11.5–15.5)
IMM GRANULOCYTES # BLD: 0.04 E9/L
IMM GRANULOCYTES NFR BLD: 0.5 % (ref 0–5)
LYMPHOCYTES # BLD: 1.4 E9/L (ref 1.5–4)
LYMPHOCYTES NFR BLD: 16.7 % (ref 20–42)
MCH RBC QN AUTO: 30 PG (ref 26–35)
MCHC RBC AUTO-ENTMCNC: 31.7 % (ref 32–34.5)
MCV RBC AUTO: 94.7 FL (ref 80–99.9)
METER GLUCOSE: 197 MG/DL (ref 74–99)
METER GLUCOSE: 204 MG/DL (ref 74–99)
METER GLUCOSE: 91 MG/DL (ref 74–99)
MONOCYTES # BLD: 0.78 E9/L (ref 0.1–0.95)
MONOCYTES NFR BLD: 9.3 % (ref 2–12)
NEUTROPHILS # BLD: 6.09 E9/L (ref 1.8–7.3)
NEUTS SEG NFR BLD: 72.7 % (ref 43–80)
PLATELET # BLD AUTO: 343 E9/L (ref 130–450)
PMV BLD AUTO: 8.8 FL (ref 7–12)
POTASSIUM SERPL-SCNC: 4.2 MMOL/L (ref 3.5–5)
RBC # BLD AUTO: 3.8 E12/L (ref 3.5–5.5)
SODIUM SERPL-SCNC: 136 MMOL/L (ref 132–146)
WBC # BLD: 8.4 E9/L (ref 4.5–11.5)

## 2023-03-24 PROCEDURE — 2580000003 HC RX 258: Performed by: INTERNAL MEDICINE

## 2023-03-24 PROCEDURE — 6370000000 HC RX 637 (ALT 250 FOR IP): Performed by: INTERNAL MEDICINE

## 2023-03-24 PROCEDURE — 94640 AIRWAY INHALATION TREATMENT: CPT

## 2023-03-24 PROCEDURE — 80048 BASIC METABOLIC PNL TOTAL CA: CPT

## 2023-03-24 PROCEDURE — 36415 COLL VENOUS BLD VENIPUNCTURE: CPT

## 2023-03-24 PROCEDURE — 1200000000 HC SEMI PRIVATE

## 2023-03-24 PROCEDURE — 94660 CPAP INITIATION&MGMT: CPT

## 2023-03-24 PROCEDURE — 85025 COMPLETE CBC W/AUTO DIFF WBC: CPT

## 2023-03-24 PROCEDURE — 82962 GLUCOSE BLOOD TEST: CPT

## 2023-03-24 PROCEDURE — 6360000002 HC RX W HCPCS: Performed by: INTERNAL MEDICINE

## 2023-03-24 PROCEDURE — 2700000000 HC OXYGEN THERAPY PER DAY

## 2023-03-24 RX ORDER — ENOXAPARIN SODIUM 100 MG/ML
30 INJECTION SUBCUTANEOUS 2 TIMES DAILY
Status: DISCONTINUED | OUTPATIENT
Start: 2023-03-25 | End: 2023-03-27 | Stop reason: HOSPADM

## 2023-03-24 RX ADMIN — GUAIFENESIN AND DEXTROMETHORPHAN 10 ML: 100; 10 SYRUP ORAL at 08:33

## 2023-03-24 RX ADMIN — CEFTRIAXONE 1000 MG: 1 INJECTION, POWDER, FOR SOLUTION INTRAMUSCULAR; INTRAVENOUS at 14:33

## 2023-03-24 RX ADMIN — IPRATROPIUM BROMIDE AND ALBUTEROL SULFATE 1 AMPULE: .5; 2.5 SOLUTION RESPIRATORY (INHALATION) at 13:33

## 2023-03-24 RX ADMIN — Medication 10 ML: at 21:04

## 2023-03-24 RX ADMIN — PANTOPRAZOLE SODIUM 40 MG: 40 TABLET, DELAYED RELEASE ORAL at 07:32

## 2023-03-24 RX ADMIN — SPIRONOLACTONE 25 MG: 25 TABLET ORAL at 21:04

## 2023-03-24 RX ADMIN — PREDNISONE 40 MG: 20 TABLET ORAL at 08:20

## 2023-03-24 RX ADMIN — POTASSIUM CHLORIDE 20 MEQ: 1500 TABLET, EXTENDED RELEASE ORAL at 08:20

## 2023-03-24 RX ADMIN — ENOXAPARIN SODIUM 40 MG: 100 INJECTION SUBCUTANEOUS at 08:19

## 2023-03-24 RX ADMIN — ACETAMINOPHEN 500 MG: 500 TABLET, FILM COATED ORAL at 17:11

## 2023-03-24 RX ADMIN — Medication 10 ML: at 08:26

## 2023-03-24 RX ADMIN — CETIRIZINE HYDROCHLORIDE 5 MG: 10 TABLET, FILM COATED ORAL at 08:20

## 2023-03-24 RX ADMIN — IPRATROPIUM BROMIDE AND ALBUTEROL SULFATE 1 AMPULE: .5; 2.5 SOLUTION RESPIRATORY (INHALATION) at 16:56

## 2023-03-24 RX ADMIN — LISINOPRIL 10 MG: 10 TABLET ORAL at 08:21

## 2023-03-24 RX ADMIN — MESALAMINE 800 MG: 400 CAPSULE, DELAYED RELEASE ORAL at 17:08

## 2023-03-24 RX ADMIN — CYANOCOBALAMIN TAB 1000 MCG 1000 MCG: 1000 TAB at 08:19

## 2023-03-24 RX ADMIN — MAGNESIUM OXIDE 400 MG: 400 TABLET ORAL at 08:19

## 2023-03-24 RX ADMIN — AZITHROMYCIN MONOHYDRATE 500 MG: 250 TABLET ORAL at 08:19

## 2023-03-24 RX ADMIN — HYDROXYZINE PAMOATE 25 MG: 25 CAPSULE ORAL at 08:20

## 2023-03-24 RX ADMIN — MESALAMINE 800 MG: 400 CAPSULE, DELAYED RELEASE ORAL at 08:33

## 2023-03-24 RX ADMIN — SPIRONOLACTONE 25 MG: 25 TABLET ORAL at 08:20

## 2023-03-24 RX ADMIN — GUAIFENESIN AND DEXTROMETHORPHAN 10 ML: 100; 10 SYRUP ORAL at 21:04

## 2023-03-24 RX ADMIN — HYDROXYZINE HYDROCHLORIDE 25 MG: 25 TABLET ORAL at 21:03

## 2023-03-24 RX ADMIN — ACYCLOVIR 800 MG: 400 TABLET ORAL at 08:33

## 2023-03-24 RX ADMIN — IPRATROPIUM BROMIDE AND ALBUTEROL SULFATE 1 AMPULE: .5; 2.5 SOLUTION RESPIRATORY (INHALATION) at 10:21

## 2023-03-24 RX ADMIN — PAROXETINE HYDROCHLORIDE 25 MG: 20 TABLET, FILM COATED ORAL at 08:20

## 2023-03-24 RX ADMIN — ATORVASTATIN CALCIUM 20 MG: 20 TABLET, FILM COATED ORAL at 08:21

## 2023-03-24 RX ADMIN — Medication 400 UNITS: at 08:20

## 2023-03-24 RX ADMIN — Medication 400 UNITS: at 21:03

## 2023-03-24 ASSESSMENT — PAIN SCALES - GENERAL
PAINLEVEL_OUTOF10: 0
PAINLEVEL_OUTOF10: 0
PAINLEVEL_OUTOF10: 7

## 2023-03-24 ASSESSMENT — PAIN DESCRIPTION - LOCATION: LOCATION: HEAD

## 2023-03-24 ASSESSMENT — PAIN DESCRIPTION - DESCRIPTORS: DESCRIPTORS: ACHING;DISCOMFORT;NAGGING

## 2023-03-24 ASSESSMENT — PAIN DESCRIPTION - ORIENTATION: ORIENTATION: LEFT

## 2023-03-25 LAB
ANION GAP SERPL CALCULATED.3IONS-SCNC: 8 MMOL/L (ref 7–16)
BASOPHILS # BLD: 0.04 E9/L (ref 0–0.2)
BASOPHILS NFR BLD: 0.4 % (ref 0–2)
BUN SERPL-MCNC: 17 MG/DL (ref 6–23)
CALCIUM SERPL-MCNC: 8.8 MG/DL (ref 8.6–10.2)
CHLORIDE SERPL-SCNC: 99 MMOL/L (ref 98–107)
CO2 SERPL-SCNC: 25 MMOL/L (ref 22–29)
CREAT SERPL-MCNC: 0.7 MG/DL (ref 0.5–1)
EOSINOPHIL # BLD: 0.05 E9/L (ref 0.05–0.5)
EOSINOPHIL NFR BLD: 0.5 % (ref 0–6)
ERYTHROCYTE [DISTWIDTH] IN BLOOD BY AUTOMATED COUNT: 12.9 FL (ref 11.5–15)
GLUCOSE SERPL-MCNC: 86 MG/DL (ref 74–99)
HCT VFR BLD AUTO: 37.1 % (ref 34–48)
HGB BLD-MCNC: 11.4 G/DL (ref 11.5–15.5)
IMM GRANULOCYTES # BLD: 0.05 E9/L
IMM GRANULOCYTES NFR BLD: 0.5 % (ref 0–5)
LYMPHOCYTES # BLD: 2.26 E9/L (ref 1.5–4)
LYMPHOCYTES NFR BLD: 24.5 % (ref 20–42)
MCH RBC QN AUTO: 29.8 PG (ref 26–35)
MCHC RBC AUTO-ENTMCNC: 30.7 % (ref 32–34.5)
MCV RBC AUTO: 97.1 FL (ref 80–99.9)
METER GLUCOSE: 100 MG/DL (ref 74–99)
METER GLUCOSE: 120 MG/DL (ref 74–99)
METER GLUCOSE: 211 MG/DL (ref 74–99)
MONOCYTES # BLD: 0.79 E9/L (ref 0.1–0.95)
MONOCYTES NFR BLD: 8.6 % (ref 2–12)
NEUTROPHILS # BLD: 6.04 E9/L (ref 1.8–7.3)
NEUTS SEG NFR BLD: 65.5 % (ref 43–80)
PLATELET # BLD AUTO: 391 E9/L (ref 130–450)
PMV BLD AUTO: 9 FL (ref 7–12)
POTASSIUM SERPL-SCNC: 4.1 MMOL/L (ref 3.5–5)
RBC # BLD AUTO: 3.82 E12/L (ref 3.5–5.5)
SODIUM SERPL-SCNC: 132 MMOL/L (ref 132–146)
WBC # BLD: 9.2 E9/L (ref 4.5–11.5)

## 2023-03-25 PROCEDURE — 2580000003 HC RX 258: Performed by: INTERNAL MEDICINE

## 2023-03-25 PROCEDURE — 2700000000 HC OXYGEN THERAPY PER DAY

## 2023-03-25 PROCEDURE — 36415 COLL VENOUS BLD VENIPUNCTURE: CPT

## 2023-03-25 PROCEDURE — 85025 COMPLETE CBC W/AUTO DIFF WBC: CPT

## 2023-03-25 PROCEDURE — 1200000000 HC SEMI PRIVATE

## 2023-03-25 PROCEDURE — 6370000000 HC RX 637 (ALT 250 FOR IP): Performed by: INTERNAL MEDICINE

## 2023-03-25 PROCEDURE — 94640 AIRWAY INHALATION TREATMENT: CPT

## 2023-03-25 PROCEDURE — 80048 BASIC METABOLIC PNL TOTAL CA: CPT

## 2023-03-25 PROCEDURE — 94660 CPAP INITIATION&MGMT: CPT

## 2023-03-25 PROCEDURE — 82962 GLUCOSE BLOOD TEST: CPT

## 2023-03-25 PROCEDURE — 6360000002 HC RX W HCPCS: Performed by: INTERNAL MEDICINE

## 2023-03-25 RX ADMIN — SPIRONOLACTONE 25 MG: 25 TABLET ORAL at 20:54

## 2023-03-25 RX ADMIN — Medication 400 UNITS: at 20:58

## 2023-03-25 RX ADMIN — MESALAMINE 800 MG: 400 CAPSULE, DELAYED RELEASE ORAL at 17:10

## 2023-03-25 RX ADMIN — ACYCLOVIR 800 MG: 400 TABLET ORAL at 09:10

## 2023-03-25 RX ADMIN — ACETAMINOPHEN 500 MG: 500 TABLET, FILM COATED ORAL at 21:02

## 2023-03-25 RX ADMIN — MESALAMINE 800 MG: 400 CAPSULE, DELAYED RELEASE ORAL at 09:01

## 2023-03-25 RX ADMIN — Medication 10 ML: at 09:03

## 2023-03-25 RX ADMIN — CETIRIZINE HYDROCHLORIDE 5 MG: 10 TABLET, FILM COATED ORAL at 09:02

## 2023-03-25 RX ADMIN — ENOXAPARIN SODIUM 30 MG: 100 INJECTION SUBCUTANEOUS at 15:06

## 2023-03-25 RX ADMIN — Medication 10 ML: at 21:04

## 2023-03-25 RX ADMIN — LISINOPRIL 10 MG: 10 TABLET ORAL at 09:02

## 2023-03-25 RX ADMIN — IPRATROPIUM BROMIDE AND ALBUTEROL SULFATE 1 AMPULE: .5; 2.5 SOLUTION RESPIRATORY (INHALATION) at 06:14

## 2023-03-25 RX ADMIN — HYDROXYZINE HYDROCHLORIDE 25 MG: 25 TABLET ORAL at 09:10

## 2023-03-25 RX ADMIN — AZITHROMYCIN MONOHYDRATE 500 MG: 250 TABLET ORAL at 09:02

## 2023-03-25 RX ADMIN — GUAIFENESIN AND DEXTROMETHORPHAN 10 ML: 100; 10 SYRUP ORAL at 20:53

## 2023-03-25 RX ADMIN — SPIRONOLACTONE 25 MG: 25 TABLET ORAL at 09:00

## 2023-03-25 RX ADMIN — GUAIFENESIN AND DEXTROMETHORPHAN 10 ML: 100; 10 SYRUP ORAL at 15:15

## 2023-03-25 RX ADMIN — PANTOPRAZOLE SODIUM 40 MG: 40 TABLET, DELAYED RELEASE ORAL at 06:24

## 2023-03-25 RX ADMIN — CYANOCOBALAMIN TAB 1000 MCG 1000 MCG: 1000 TAB at 09:01

## 2023-03-25 RX ADMIN — HYDROXYZINE HYDROCHLORIDE 25 MG: 25 TABLET ORAL at 20:54

## 2023-03-25 RX ADMIN — Medication 400 UNITS: at 09:02

## 2023-03-25 RX ADMIN — IPRATROPIUM BROMIDE AND ALBUTEROL SULFATE 1 AMPULE: .5; 2.5 SOLUTION RESPIRATORY (INHALATION) at 17:15

## 2023-03-25 RX ADMIN — ENOXAPARIN SODIUM 30 MG: 100 INJECTION SUBCUTANEOUS at 20:58

## 2023-03-25 RX ADMIN — PREDNISONE 40 MG: 20 TABLET ORAL at 09:01

## 2023-03-25 RX ADMIN — MAGNESIUM OXIDE 400 MG: 400 TABLET ORAL at 09:02

## 2023-03-25 RX ADMIN — PAROXETINE HYDROCHLORIDE 25 MG: 20 TABLET, FILM COATED ORAL at 09:01

## 2023-03-25 RX ADMIN — ATORVASTATIN CALCIUM 20 MG: 20 TABLET, FILM COATED ORAL at 09:02

## 2023-03-25 RX ADMIN — CEFTRIAXONE 1000 MG: 1 INJECTION, POWDER, FOR SOLUTION INTRAMUSCULAR; INTRAVENOUS at 15:06

## 2023-03-25 RX ADMIN — IPRATROPIUM BROMIDE AND ALBUTEROL SULFATE 1 AMPULE: .5; 2.5 SOLUTION RESPIRATORY (INHALATION) at 14:08

## 2023-03-25 RX ADMIN — Medication 5 MG: at 20:54

## 2023-03-25 RX ADMIN — IPRATROPIUM BROMIDE AND ALBUTEROL SULFATE 1 AMPULE: .5; 2.5 SOLUTION RESPIRATORY (INHALATION) at 09:49

## 2023-03-25 RX ADMIN — POTASSIUM CHLORIDE 20 MEQ: 1500 TABLET, EXTENDED RELEASE ORAL at 09:00

## 2023-03-25 ASSESSMENT — PAIN DESCRIPTION - LOCATION: LOCATION: HEAD

## 2023-03-25 ASSESSMENT — PAIN DESCRIPTION - ORIENTATION: ORIENTATION: OTHER (COMMENT)

## 2023-03-25 ASSESSMENT — PAIN SCALES - GENERAL
PAINLEVEL_OUTOF10: 2
PAINLEVEL_OUTOF10: 0

## 2023-03-25 ASSESSMENT — PAIN - FUNCTIONAL ASSESSMENT: PAIN_FUNCTIONAL_ASSESSMENT: ACTIVITIES ARE NOT PREVENTED

## 2023-03-25 ASSESSMENT — PAIN DESCRIPTION - DESCRIPTORS: DESCRIPTORS: ACHING;DULL;DISCOMFORT

## 2023-03-25 NOTE — PLAN OF CARE
Problem: Safety - Adult  Goal: Free from fall injury  Outcome: Progressing     Problem: Respiratory - Adult  Goal: Achieves optimal ventilation and oxygenation  Outcome: Progressing     Problem: Chronic Conditions and Co-morbidities  Goal: Patient's chronic conditions and co-morbidity symptoms are monitored and maintained or improved  Outcome: Progressing

## 2023-03-26 LAB
ANION GAP SERPL CALCULATED.3IONS-SCNC: 10 MMOL/L (ref 7–16)
BASOPHILS # BLD: 0.04 E9/L (ref 0–0.2)
BASOPHILS NFR BLD: 0.5 % (ref 0–2)
BUN SERPL-MCNC: 15 MG/DL (ref 6–23)
CALCIUM SERPL-MCNC: 9.1 MG/DL (ref 8.6–10.2)
CHLORIDE SERPL-SCNC: 101 MMOL/L (ref 98–107)
CO2 SERPL-SCNC: 26 MMOL/L (ref 22–29)
CREAT SERPL-MCNC: 0.8 MG/DL (ref 0.5–1)
EOSINOPHIL # BLD: 0.09 E9/L (ref 0.05–0.5)
EOSINOPHIL NFR BLD: 1.2 % (ref 0–6)
ERYTHROCYTE [DISTWIDTH] IN BLOOD BY AUTOMATED COUNT: 13.1 FL (ref 11.5–15)
GLUCOSE SERPL-MCNC: 95 MG/DL (ref 74–99)
HCT VFR BLD AUTO: 37.9 % (ref 34–48)
HGB BLD-MCNC: 12.1 G/DL (ref 11.5–15.5)
IMM GRANULOCYTES # BLD: 0.02 E9/L
IMM GRANULOCYTES NFR BLD: 0.3 % (ref 0–5)
LYMPHOCYTES # BLD: 2.64 E9/L (ref 1.5–4)
LYMPHOCYTES NFR BLD: 35.6 % (ref 20–42)
MCH RBC QN AUTO: 30.8 PG (ref 26–35)
MCHC RBC AUTO-ENTMCNC: 31.9 % (ref 32–34.5)
MCV RBC AUTO: 96.4 FL (ref 80–99.9)
METER GLUCOSE: 169 MG/DL (ref 74–99)
METER GLUCOSE: 212 MG/DL (ref 74–99)
METER GLUCOSE: 90 MG/DL (ref 74–99)
MONOCYTES # BLD: 0.66 E9/L (ref 0.1–0.95)
MONOCYTES NFR BLD: 8.9 % (ref 2–12)
NEUTROPHILS # BLD: 3.97 E9/L (ref 1.8–7.3)
NEUTS SEG NFR BLD: 53.5 % (ref 43–80)
PLATELET # BLD AUTO: 398 E9/L (ref 130–450)
PMV BLD AUTO: 9 FL (ref 7–12)
POTASSIUM SERPL-SCNC: 3.9 MMOL/L (ref 3.5–5)
RBC # BLD AUTO: 3.93 E12/L (ref 3.5–5.5)
SODIUM SERPL-SCNC: 137 MMOL/L (ref 132–146)
WBC # BLD: 7.4 E9/L (ref 4.5–11.5)

## 2023-03-26 PROCEDURE — 6370000000 HC RX 637 (ALT 250 FOR IP): Performed by: INTERNAL MEDICINE

## 2023-03-26 PROCEDURE — 2700000000 HC OXYGEN THERAPY PER DAY

## 2023-03-26 PROCEDURE — 94640 AIRWAY INHALATION TREATMENT: CPT

## 2023-03-26 PROCEDURE — 2580000003 HC RX 258: Performed by: INTERNAL MEDICINE

## 2023-03-26 PROCEDURE — 82962 GLUCOSE BLOOD TEST: CPT

## 2023-03-26 PROCEDURE — 94660 CPAP INITIATION&MGMT: CPT

## 2023-03-26 PROCEDURE — 36415 COLL VENOUS BLD VENIPUNCTURE: CPT

## 2023-03-26 PROCEDURE — 6360000002 HC RX W HCPCS: Performed by: INTERNAL MEDICINE

## 2023-03-26 PROCEDURE — 1200000000 HC SEMI PRIVATE

## 2023-03-26 PROCEDURE — 80048 BASIC METABOLIC PNL TOTAL CA: CPT

## 2023-03-26 PROCEDURE — 85025 COMPLETE CBC W/AUTO DIFF WBC: CPT

## 2023-03-26 RX ADMIN — IPRATROPIUM BROMIDE AND ALBUTEROL SULFATE 1 AMPULE: .5; 2.5 SOLUTION RESPIRATORY (INHALATION) at 10:07

## 2023-03-26 RX ADMIN — SPIRONOLACTONE 25 MG: 25 TABLET ORAL at 21:08

## 2023-03-26 RX ADMIN — HYDROXYZINE HYDROCHLORIDE 25 MG: 25 TABLET ORAL at 08:45

## 2023-03-26 RX ADMIN — LISINOPRIL 10 MG: 10 TABLET ORAL at 08:46

## 2023-03-26 RX ADMIN — SPIRONOLACTONE 25 MG: 25 TABLET ORAL at 08:46

## 2023-03-26 RX ADMIN — PAROXETINE HYDROCHLORIDE 25 MG: 20 TABLET, FILM COATED ORAL at 08:45

## 2023-03-26 RX ADMIN — CETIRIZINE HYDROCHLORIDE 5 MG: 10 TABLET, FILM COATED ORAL at 08:46

## 2023-03-26 RX ADMIN — ACYCLOVIR 800 MG: 400 TABLET ORAL at 08:45

## 2023-03-26 RX ADMIN — Medication 400 UNITS: at 21:08

## 2023-03-26 RX ADMIN — MAGNESIUM OXIDE 400 MG: 400 TABLET ORAL at 08:46

## 2023-03-26 RX ADMIN — CEFTRIAXONE 1000 MG: 1 INJECTION, POWDER, FOR SOLUTION INTRAMUSCULAR; INTRAVENOUS at 14:39

## 2023-03-26 RX ADMIN — CYANOCOBALAMIN TAB 1000 MCG 1000 MCG: 1000 TAB at 08:46

## 2023-03-26 RX ADMIN — Medication 10 ML: at 08:47

## 2023-03-26 RX ADMIN — ATORVASTATIN CALCIUM 20 MG: 20 TABLET, FILM COATED ORAL at 08:46

## 2023-03-26 RX ADMIN — MESALAMINE 800 MG: 400 CAPSULE, DELAYED RELEASE ORAL at 16:49

## 2023-03-26 RX ADMIN — IPRATROPIUM BROMIDE AND ALBUTEROL SULFATE 1 AMPULE: .5; 2.5 SOLUTION RESPIRATORY (INHALATION) at 14:25

## 2023-03-26 RX ADMIN — MESALAMINE 800 MG: 400 CAPSULE, DELAYED RELEASE ORAL at 08:47

## 2023-03-26 RX ADMIN — AZITHROMYCIN MONOHYDRATE 500 MG: 250 TABLET ORAL at 08:46

## 2023-03-26 RX ADMIN — IPRATROPIUM BROMIDE AND ALBUTEROL SULFATE 1 AMPULE: .5; 2.5 SOLUTION RESPIRATORY (INHALATION) at 06:33

## 2023-03-26 RX ADMIN — PANTOPRAZOLE SODIUM 40 MG: 40 TABLET, DELAYED RELEASE ORAL at 06:20

## 2023-03-26 RX ADMIN — ENOXAPARIN SODIUM 30 MG: 100 INJECTION SUBCUTANEOUS at 21:08

## 2023-03-26 RX ADMIN — IPRATROPIUM BROMIDE AND ALBUTEROL SULFATE 1 AMPULE: .5; 2.5 SOLUTION RESPIRATORY (INHALATION) at 19:21

## 2023-03-26 RX ADMIN — HYDROXYZINE HYDROCHLORIDE 25 MG: 25 TABLET ORAL at 21:08

## 2023-03-26 RX ADMIN — PREDNISONE 40 MG: 20 TABLET ORAL at 08:46

## 2023-03-26 RX ADMIN — Medication 400 UNITS: at 08:46

## 2023-03-26 RX ADMIN — POTASSIUM CHLORIDE 20 MEQ: 1500 TABLET, EXTENDED RELEASE ORAL at 08:46

## 2023-03-26 RX ADMIN — ENOXAPARIN SODIUM 30 MG: 100 INJECTION SUBCUTANEOUS at 08:44

## 2023-03-26 ASSESSMENT — PAIN SCALES - GENERAL: PAINLEVEL_OUTOF10: 0

## 2023-03-27 VITALS
HEIGHT: 67 IN | BODY MASS INDEX: 35.16 KG/M2 | WEIGHT: 224 LBS | TEMPERATURE: 98.2 F | OXYGEN SATURATION: 91 % | SYSTOLIC BLOOD PRESSURE: 126 MMHG | RESPIRATION RATE: 20 BRPM | DIASTOLIC BLOOD PRESSURE: 70 MMHG | HEART RATE: 77 BPM

## 2023-03-27 LAB
BACTERIA BLD CULT ORG #2: NORMAL
BACTERIA BLD CULT: NORMAL
METER GLUCOSE: 86 MG/DL (ref 74–99)

## 2023-03-27 PROCEDURE — 2580000003 HC RX 258: Performed by: INTERNAL MEDICINE

## 2023-03-27 PROCEDURE — 6370000000 HC RX 637 (ALT 250 FOR IP): Performed by: INTERNAL MEDICINE

## 2023-03-27 PROCEDURE — 94640 AIRWAY INHALATION TREATMENT: CPT

## 2023-03-27 PROCEDURE — 94660 CPAP INITIATION&MGMT: CPT

## 2023-03-27 PROCEDURE — 82962 GLUCOSE BLOOD TEST: CPT

## 2023-03-27 PROCEDURE — 6360000002 HC RX W HCPCS: Performed by: INTERNAL MEDICINE

## 2023-03-27 RX ORDER — CEFDINIR 300 MG/1
300 CAPSULE ORAL 2 TIMES DAILY
Qty: 14 CAPSULE | Refills: 0 | Status: SHIPPED | OUTPATIENT
Start: 2023-03-27 | End: 2023-04-03

## 2023-03-27 RX ORDER — DOXYCYCLINE HYCLATE 100 MG/1
100 CAPSULE ORAL 2 TIMES DAILY
Qty: 14 CAPSULE | Refills: 0 | Status: SHIPPED | OUTPATIENT
Start: 2023-03-27 | End: 2023-04-03

## 2023-03-27 RX ADMIN — MAGNESIUM OXIDE 400 MG: 400 TABLET ORAL at 10:07

## 2023-03-27 RX ADMIN — ENOXAPARIN SODIUM 30 MG: 100 INJECTION SUBCUTANEOUS at 09:53

## 2023-03-27 RX ADMIN — CYANOCOBALAMIN TAB 1000 MCG 1000 MCG: 1000 TAB at 09:49

## 2023-03-27 RX ADMIN — Medication 10 ML: at 02:00

## 2023-03-27 RX ADMIN — AZITHROMYCIN MONOHYDRATE 500 MG: 250 TABLET ORAL at 09:49

## 2023-03-27 RX ADMIN — ATORVASTATIN CALCIUM 20 MG: 20 TABLET, FILM COATED ORAL at 09:47

## 2023-03-27 RX ADMIN — ACYCLOVIR 800 MG: 400 TABLET ORAL at 09:49

## 2023-03-27 RX ADMIN — IPRATROPIUM BROMIDE AND ALBUTEROL SULFATE 1 AMPULE: .5; 2.5 SOLUTION RESPIRATORY (INHALATION) at 06:32

## 2023-03-27 RX ADMIN — Medication 400 UNITS: at 09:47

## 2023-03-27 RX ADMIN — PAROXETINE HYDROCHLORIDE 25 MG: 20 TABLET, FILM COATED ORAL at 09:48

## 2023-03-27 RX ADMIN — SPIRONOLACTONE 25 MG: 25 TABLET ORAL at 09:47

## 2023-03-27 RX ADMIN — HYDROXYZINE HYDROCHLORIDE 25 MG: 25 TABLET ORAL at 09:48

## 2023-03-27 RX ADMIN — POTASSIUM CHLORIDE 20 MEQ: 1500 TABLET, EXTENDED RELEASE ORAL at 09:49

## 2023-03-27 RX ADMIN — CETIRIZINE HYDROCHLORIDE 5 MG: 10 TABLET, FILM COATED ORAL at 09:49

## 2023-03-27 RX ADMIN — LISINOPRIL 10 MG: 10 TABLET ORAL at 09:49

## 2023-03-27 RX ADMIN — GUAIFENESIN AND DEXTROMETHORPHAN 10 ML: 100; 10 SYRUP ORAL at 00:37

## 2023-03-27 RX ADMIN — PANTOPRAZOLE SODIUM 40 MG: 40 TABLET, DELAYED RELEASE ORAL at 05:55

## 2023-03-27 RX ADMIN — Medication 10 ML: at 10:30

## 2023-03-27 RX ADMIN — PREDNISONE 40 MG: 20 TABLET ORAL at 09:47

## 2023-03-27 RX ADMIN — MESALAMINE 800 MG: 400 CAPSULE, DELAYED RELEASE ORAL at 09:49

## 2023-03-27 RX ADMIN — IPRATROPIUM BROMIDE AND ALBUTEROL SULFATE 1 AMPULE: .5; 2.5 SOLUTION RESPIRATORY (INHALATION) at 09:54

## 2023-03-27 ASSESSMENT — PAIN SCALES - GENERAL: PAINLEVEL_OUTOF10: 0

## 2023-03-27 NOTE — PLAN OF CARE
Problem: Safety - Adult  Goal: Free from fall injury  Outcome: Progressing     Problem: Respiratory - Adult  Goal: Achieves optimal ventilation and oxygenation  Outcome: Progressing

## 2023-03-27 NOTE — DISCHARGE SUMMARY
Past Surgical History:   Procedure Laterality Date    BLADDER SURGERY  bladder bx benign and urethra stretched    BLADDER SUSPENSION      cystocele repair also    COLONOSCOPY      ENDOSCOPY, COLON, DIAGNOSTIC      ESOPHAGEAL MOTILITY STUDY N/A 12/06/2021    ESOPHAGEAL MOTILITY/MANOMETRY STUDY performed by Jones Jiménez DO at [address] [facility]. Elbow    HIATAL HERNIA REPAIR N/A 12/30/2021    LAPAROSCOPIC ROBOTIC XI PARAESOPHAGEAL HERNIA REPAIR performed by Tj Willett MD at [address] ([address] [facility])      partial    JOINT REPLACEMENT Left     bilateral knee    KNEE ARTHROSCOPY  06/04/2012    left    KNEE ARTHROSCOPY      left    LITHOTRIPSY  12/20/2013    RECTAL PROLAPSE REPAIR      SKIN BIOPSY      TOTAL KNEE ARTHROPLASTY      Left Knee    TOTAL KNEE ARTHROPLASTY Right 07/30/2020    RIGHT TOTAL KNEE ARTHROPLASTY ([address]) performed by Nicole Basurto DO at [address] [facility] [address] ENDOSCOPY N/A 12/06/2021    EGD BIOPSY performed by Tj Willett MD at [address] [facility] Hx:  Family History   Problem Relation Age of Onset    Ovarian Cancer Mother     Cancer Mother     Heart Disease Father     Ovarian Cancer Sister     Cancer Sister     Heart Disease Brother        HOME MEDICATIONS:  Prior to Admission medications    Medication Sig Start Date End Date Taking?  Authorizing Provider   albuterol (PROVENTIL) (2.5 MG/3ML) 0.083% nebulizer solution Take 2.5 mg by nebulization every 6 hours as needed for Wheezing   Yes Historical Provider, MD   vitamin E 400 UNIT capsule Take 400 Units by mouth daily   Yes Historical Provider, MD   hydrOXYzine pamoate (VISTARIL) 25 MG capsule Take 50 mg by mouth every evening   Yes Historical Provider, MD   hydrOXYzine pamoate (VISTARIL) 25 MG capsule Take 25 mg by mouth daily   Yes Historical Provider, MD   naproxen (NAPROSYN) 250 MG tablet Take 250 mg by mouth nightly

## 2023-03-27 NOTE — PLAN OF CARE
Problem: Safety - Adult  Goal: Free from fall injury  3/27/2023 1604 by Janelle Arciniega RN  Outcome: Completed     Problem: Chronic Conditions and Co-morbidities  Goal: Patient's chronic conditions and co-morbidity symptoms are monitored and maintained or improved  Outcome: Completed     Problem: Pain  Goal: Verbalizes/displays adequate comfort level or baseline comfort level  Outcome: Completed

## 2023-03-27 NOTE — PROGRESS NOTES
CLINICAL PHARMACY NOTE: MEDS TO BEDS    Total # of Prescriptions Filled: 2   The following medications were delivered to the patient:  Doxycycline hyclate 100 mg  Cefdinir 300 mg    Additional Documentation:
Department of Internal Medicine  PN    PCP: Drake Hahn DO  Admitting Physician: Dr. [facility]  Consultants:   Date of Service: 3/22/2023    CHIEF COMPLAINT:  sob    HISTORY OF PRESENT ILLNESS:    Patient is a 71-year-old female who presented to the ED with shortness of breath. Patient states her symptoms have been going on for the last month after visiting her sister in the hospital.  However over the last few days she has been having increased shortness of breath. She does admit to productive cough with yellow sputum. She does have wheezing. shortness of breath is worse with exertion. She denies any chest pain. She has been taking doxycycline and Medrol Dosepak. She finished Medrol Dosepak about a week ago. 3/23/2023  Patient seen examined on telemetry floor. Patient states he feels a little bit better today. She denies any productive cough today. There is no chest pain, abdominal pain, nausea or vomiting but the patient is feeling very weak. BUN/creatinine 15/0.9 with normal electrolytes. Liver enzymes normal with WBC 9.2 and hemoglobin 11.3 with viral respiratory panel was normal.  Temperature is 98.5 with heart rate 88 blood pressure 122/58. O2 sat 98% on 3 L nasal cannula. 3/24/2023  Patient seen and examined on telemetry floor. Patient states she feels a bit better today. The patient states she walked to the bathroom and back and when she did come back she get a little bit lightheaded before she got back to the bed. BUN/creatinine 17/0.8 normal electrolytes. Liver enzymes are normal and the WBC 8.4 and hemoglobin 11.4. Temperatures 97.8 with heart rate of 79 blood pressure 120/57. O2 sat 99% on 3 L nasal cannula. Ambulate patient in the hallway and monitor O2 saturations with and without oxygen.     PAST MEDICAL Hx:  Past Medical History:   Diagnosis Date    Anesthesia     blood pressure goes low during surgery    Anxiety and depression     Arthritis     Asthma     Crohn
Department of Internal Medicine  PN    PCP: Gretta Buck DO  Admitting Physician: Dr. Fabby Florentino  Consultants:   Date of Service: 3/22/2023    CHIEF COMPLAINT:  sob    HISTORY OF PRESENT ILLNESS:    Patient is a 26-year-old female who presented to the ED with shortness of breath. Patient states her symptoms have been going on for the last month after visiting her sister in the hospital.  However over the last few days she has been having increased shortness of breath. She does admit to productive cough with yellow sputum. She does have wheezing. shortness of breath is worse with exertion. She denies any chest pain. She has been taking doxycycline and Medrol Dosepak. She finished Medrol Dosepak about a week ago. 3/23/2023  Patient seen examined on telemetry floor. Patient states he feels a little bit better today. She denies any productive cough today. There is no chest pain, abdominal pain, nausea or vomiting but the patient is feeling very weak. BUN/creatinine 15/0.9 with normal electrolytes. Liver enzymes normal with WBC 9.2 and hemoglobin 11.3 with viral respiratory panel was normal.  Temperature is 98.5 with heart rate 88 blood pressure 122/58. O2 sat 98% on 3 L nasal cannula. 3/24/2023  Patient seen and examined on telemetry floor. Patient states she feels a bit better today. The patient states she walked to the bathroom and back and when she did come back she get a little bit lightheaded before she got back to the bed. BUN/creatinine 17/0.8 normal electrolytes. Liver enzymes are normal and the WBC 8.4 and hemoglobin 11.4. Temperatures 97.8 with heart rate of 79 blood pressure 120/57. O2 sat 99% on 3 L nasal cannula. Ambulate patient in the hallway and monitor O2 saturations with and without oxygen. 3/25/2023  Patient seen and examined on telemetry floor. Patient complaining of nonproductive cough. Patient's breathing is slowly improving.   She denies any with any chest pain,
Department of Internal Medicine  PN    PCP: Ingrid Edward DO  Admitting Physician: Dr. Kailee Del Rosario  Consultants:   Date of Service: 3/22/2023    CHIEF COMPLAINT:  sob    HISTORY OF PRESENT ILLNESS:    Patient is a 29-year-old female who presented to the ED with shortness of breath. Patient states her symptoms have been going on for the last month after visiting her sister in the hospital.  However over the last few days she has been having increased shortness of breath. She does admit to productive cough with yellow sputum. She does have wheezing. shortness of breath is worse with exertion. She denies any chest pain. She has been taking doxycycline and Medrol Dosepak. She finished Medrol Dosepak about a week ago. 3/23/2023  Patient seen examined on telemetry floor. Patient states he feels a little bit better today. She denies any productive cough today. There is no chest pain, abdominal pain, nausea or vomiting but the patient is feeling very weak. BUN/creatinine 15/0.9 with normal electrolytes. Liver enzymes normal with WBC 9.2 and hemoglobin 11.3 with viral respiratory panel was normal.  Temperature is 98.5 with heart rate 88 blood pressure 122/58. O2 sat 98% on 3 L nasal cannula.     PAST MEDICAL Hx:  Past Medical History:   Diagnosis Date    Anesthesia     blood pressure goes low during surgery    Anxiety and depression     Arthritis     Asthma     Crohn disease ([address]     IBS    GERD (gastroesophageal reflux disease)     Hyperlipidemia     Hypertension     Hypoglycemia     Nausea & vomiting     PONV (postoperative nausea and vomiting)     Sleep apnea     Thyroid disease     hx/no tx @ present       PAST SURGICAL Hx:   Past Surgical History:   Procedure Laterality Date    BLADDER SURGERY  bladder bx benign and urethra stretched    BLADDER SUSPENSION      cystocele repair also    COLONOSCOPY      ENDOSCOPY, COLON, DIAGNOSTIC      ESOPHAGEAL MOTILITY STUDY N/A 12/06/2021    ESOPHAGEAL
Oxygen saturation 93% on room air at rest.  Ambulated in peace on room air and oxygen saturation dropped to 84% applied oxygen at 2L, saturation 88%, increased to 3L saturation 92% on 3L with ambulation.
Patient refused to wear mask because she stated she could not breath with it. CT chest postponed until covid test results.
Pharmacist Review and Automatic Dose Adjustment of Prophylactic Enoxaparin    Reviewed reason(s) for admission/hospital problem list    The reviewing pharmacist has made an adjustment to the ordered enoxaparin dose or converted to UFH per the approved [facility] protocol and table as identified below. Michelle Linda is a 79 y.o. female. Recent Labs     03/22/23  1251 03/23/23  0721 03/24/23  0544   CREATININE 0.9 0.9 0.8       Estimated Creatinine Clearance: 84 mL/min (based on SCr of 0.8 mg/dL). Recent Labs     03/23/23  0721 03/24/23  0544   HGB 11.3* 11.4*   HCT 35.2 36.0    343     No results for input(s): INR in the last 72 hours.     Height:   Ht Readings from Last 1 Encounters:   03/22/23 5' 7\" (1.702 m)     Weight:  Wt Readings from Last 1 Encounters:   03/22/23 224 lb (101.6 kg)               Plan: Based upon the patient's weight and renal function    Ordered: Enoxaparin 40mg SUBQ Daily    Changed/converted to    New Order: Enoxaparin 30mg SUBQ BID      Thank you,  Phyllis Chan, [facility]  3/24/2023, 10:01 AM
Pharmacy Discharge Reconciliation & Education    Counseled patient on the importance of adherence with new medications. New medication(s), Cefdinir and Doxycycline. Discussed the purpose of each medication, as well as the dose, frequency, and common side effects/mitigation strategies.     Ezekiel Hodge, [facility], DNPVOY.,8/87/1075 12:55 PM
NEUROLOGIC:    Awake, alert, oriented to name, place and time. SKIN:    No bruising or bleeding. No redness, warmth, or swelling    EXTREMITIES:    Peripheral pulses present. No edema, cyanosis, or swelling. LINES/CATHETERS     LABORATORY DATA:  CBC with Differential:    Lab Results   Component Value Date/Time    WBC 9.2 03/25/2023 06:08 AM    RBC 3.82 03/25/2023 06:08 AM    HGB 11.4 03/25/2023 06:08 AM    HCT 37.1 03/25/2023 06:08 AM     03/25/2023 06:08 AM    MCV 97.1 03/25/2023 06:08 AM    MCH 29.8 03/25/2023 06:08 AM    MCHC 30.7 03/25/2023 06:08 AM    RDW 12.9 03/25/2023 06:08 AM    SEGSPCT 64 11/14/2013 07:40 AM    LYMPHOPCT 24.5 03/25/2023 06:08 AM    MONOPCT 8.6 03/25/2023 06:08 AM    BASOPCT 0.4 03/25/2023 06:08 AM    MONOSABS 0.79 03/25/2023 06:08 AM    LYMPHSABS 2.26 03/25/2023 06:08 AM    EOSABS 0.05 03/25/2023 06:08 AM    BASOSABS 0.04 03/25/2023 06:08 AM     CMP:    Lab Results   Component Value Date/Time     03/25/2023 06:08 AM    K 4.1 03/25/2023 06:08 AM    K 4.4 12/23/2021 10:30 AM    CL 99 03/25/2023 06:08 AM    CO2 25 03/25/2023 06:08 AM    BUN 17 03/25/2023 06:08 AM    CREATININE 0.7 03/25/2023 06:08 AM    GFRAA >60 12/23/2021 10:30 AM    LABGLOM >60 03/25/2023 06:08 AM    GLUCOSE 86 03/25/2023 06:08 AM    GLUCOSE 85 04/25/2012 08:53 AM    PROT 7.1 03/23/2023 07:21 AM    LABALBU 3.6 03/23/2023 07:21 AM    LABALBU 4.2 04/25/2012 08:53 AM    CALCIUM 8.8 03/25/2023 06:08 AM    BILITOT 0.4 03/23/2023 07:21 AM    ALKPHOS 68 03/23/2023 07:21 AM    AST 17 03/23/2023 07:21 AM    ALT 10 03/23/2023 07:21 AM       ASSESSMENT/PLAN:  Acute hypoxic respiratory failure  Possible pneumonia  Acute COPD exacerbation   Asthma   Hypothyroidism  Crohn's disease  Sleep apnea with use of CPAP  GERD  Hyperlipidemia  Hypertension      Patient presented with shortness of breath. Patient found to be acutely hypoxic. Patient chest x-ray did not reveal significant disease. CT chest ordered.

## 2023-03-27 NOTE — DISCHARGE INSTRUCTIONS
Your information:  Name: Filomena Crow  : 1955    Your instructions: You are being discharged home with home oxygen. Please follow up with your physician at discharge and take your medications as prescribed. IF YOU EXPERIENCE ANY OF THE FOLLOWING SYMPTOMS, CHEST PAIN, SHORTNESS OF BREATH, COUGHING UP BLOOD OR BLOODY SPUTUM, STOMACH PAIN OR CRAMPING, DARK, TARRY STOOLS, LOSS OF APPETITE, GENERAL NOT FEELING WELL, SIGNS AND SYMPTOMS OF INFECTION LIKE FEVER AND OR CHILLS, PLEASE CALL DR HARLEY OR RETURN TO THE EMERGENCY ROOM. What to do after you leave the hospital:    Recommended diet: regular diet    Recommended activity: activity as tolerated        The following personal items were collected during your admission and were returned to you:    Belongings  Dental Appliances: Uppers  Vision - Corrective Lenses: Eyeglasses, At bedside  Hearing Aid: None  Clothing: Footwear, Pants, Socks, Shirt, Other (Comment), At bedside (vest- no coat)  Jewelry: Bracelet, Ring, Necklace, Earrings (5 rings)  Body Piercings Removed: N/A  Electronic Devices: Cell Phone, At bedside  Weapons (Notify Protective Services/Security): None  Other Valuables: [address] [place] [address], [address] [place] [address], Money, Emergency Alert Device  Home Medications: None  Valuables Given To: Patient  Provide Name(s) of Who Valuable(s) Were Given To: patient    Information obtained by:  By signing below, I understand that if any problems occur once I leave the hospital I am to contact PCP. I understand and acknowledge receipt of the instructions indicated above.

## 2023-03-27 NOTE — CARE COORDINATION
Pt on 3 liters, no home 02. Patient plans to return home on DC, she is independent and Resides alone, has housekeeping service two times per week. She states if home oxygen needed she would prefer [employer]. SW made tentative referral to [facility] to follow for possible home oxygen need. Will need pulse ox testing to determine need and DME order if in fact home oxygen needed at DC. Patient states she drove herself here and plans to drive self home on DC.
Ss note: 3/27/762033:49 AM  discharge order noted. Obtained signed home oxygen script. Referral placed to [facility] at [facility] and orders and testing(testing is valid through today) faxed to office. Will await delivery of portable tank to bedside, pt to contact [facility] upon arrival home to set up concentrator. [facility] [phone] fax [phone].  MINNIE Looney
Return to Present Housing: Yes  Other Identified Issues/Barriers to RETURNING to current housing: YES              Potential DME: following for possible 02 need at discharge. Patient expects to discharge to: [address] [place] [address] for transportation at discharge:  Pt drove self here. Financial    Payor: [facility] MEDICARE / Plan: Juan Ivey COMPLETE / Product Type: *No Product type* /         Potential assistance Purchasing Medications:    Meds-to-Beds request:        Saray Gimenez, [address]  [address] [place]  Phone: [phone] Fax: [phone]  Advance Care Planning   Healthcare Decision Maker:    Mariama Dumont, sister-- [phone]      Notes:    Ss note: 3/23/848332:59 AM Neg PCR 3-22-23. Met with pt, resides home alone in apt no steps. Pt relays she is independent at home, will use a rollator at times, has cane, shower chair, [address] she has housekeeping service via [address] [place] [address] two days per week. Follows with Dr. Abdi Ghosh. Hx of [facility] with [facility] a a few years ago, hx of Knee surgery. Hx of SNF at [place] [address], did not like the experience. Plan is to return home at discharge. Relays her primary contact is her sister Mariama Dumont, [phone]. Plan is home alone, pt drove self here. Will follow for possible 02 need at is discharge.  MINNIE Kumar    The Plan for Transition of Care is related to the following treatment goals of Acute respiratory failure with hypoxia ([facility] [address] [J96.01]  Pneumonia of right lower lobe due to infectious organism [J18.9]        MINNIE Kennedy  Case Management Department

## 2023-04-24 ENCOUNTER — TELEPHONE (OUTPATIENT)
Dept: SLEEP CENTER | Age: 68
End: 2023-04-24

## 2023-05-29 ENCOUNTER — TELEPHONE (OUTPATIENT)
Dept: SLEEP CENTER | Age: 68
End: 2023-05-29

## 2023-05-30 ENCOUNTER — HOSPITAL ENCOUNTER (OUTPATIENT)
Dept: SLEEP CENTER | Age: 68
Discharge: HOME | End: 2023-05-30
Payer: MEDICARE

## 2023-05-30 DIAGNOSIS — G47.33 OSA (OBSTRUCTIVE SLEEP APNEA): Primary | ICD-10-CM

## 2023-05-30 PROCEDURE — 95811 POLYSOM 6/>YRS CPAP 4/> PARM: CPT

## 2023-07-10 ENCOUNTER — HOSPITAL ENCOUNTER (OUTPATIENT)
Dept: CT IMAGING | Age: 68
Discharge: HOME | End: 2023-07-10
Payer: MEDICARE

## 2023-07-10 DIAGNOSIS — J18.9 PNEUMONIA OF LOWER LOBE DUE TO INFECTIOUS ORGANISM, UNSPECIFIED LATERALITY: ICD-10-CM

## 2023-07-10 PROCEDURE — 71250 CT THORAX DX C-: CPT

## 2023-09-22 ENCOUNTER — HOSPITAL ENCOUNTER (OUTPATIENT)
Dept: PULMONOLOGY | Age: 68
Discharge: HOME | End: 2023-09-22
Attending: INTERNAL MEDICINE
Payer: MEDICARE

## 2023-09-22 DIAGNOSIS — J84.10 POSTINFLAMMATORY PULMONARY FIBROSIS (HCC): ICD-10-CM

## 2023-09-22 PROCEDURE — 94726 PLETHYSMOGRAPHY LUNG VOLUMES: CPT

## 2023-09-22 PROCEDURE — 94060 EVALUATION OF WHEEZING: CPT

## 2023-09-22 PROCEDURE — 94729 DIFFUSING CAPACITY: CPT

## 2025-04-28 ENCOUNTER — TRANSCRIBE ORDERS (OUTPATIENT)
Dept: ADMINISTRATIVE | Age: 70
End: 2025-04-28

## 2025-04-28 DIAGNOSIS — J84.10 POSTINFLAMMATORY PULMONARY FIBROSIS (HCC): Primary | ICD-10-CM

## 2025-07-11 ENCOUNTER — HOSPITAL ENCOUNTER (OUTPATIENT)
Dept: CT IMAGING | Age: 70
Discharge: HOME | End: 2025-07-11
Attending: INTERNAL MEDICINE
Payer: MEDICARE

## 2025-07-11 ENCOUNTER — HOSPITAL ENCOUNTER (OUTPATIENT)
Dept: PULMONOLOGY | Age: 70
Discharge: HOME | End: 2025-07-11
Attending: INTERNAL MEDICINE
Payer: MEDICARE

## 2025-07-11 DIAGNOSIS — J84.10 POSTINFLAMMATORY PULMONARY FIBROSIS (HCC): ICD-10-CM

## 2025-07-11 PROCEDURE — 94729 DIFFUSING CAPACITY: CPT

## 2025-07-11 PROCEDURE — 71250 CT THORAX DX C-: CPT

## 2025-07-11 PROCEDURE — 94726 PLETHYSMOGRAPHY LUNG VOLUMES: CPT

## 2025-07-11 PROCEDURE — 94060 EVALUATION OF WHEEZING: CPT

## 2025-07-11 NOTE — PROCEDURES
[address] [address]                           PULMONARY FUNCTION      PATIENT NAME: UZMA DONAHUE                 : 1955  MED REC NO: [id number]                        ROOM:   ACCOUNT NO: [id number]                       ADMIT DATE: 2025  PROVIDER: Napoleon Resendiz MD      DATE OF PROCEDURE: 2025    SURGEON:  Napoleon Resendiz MD    REFERRING PHYSICIAN:  JARRET NORTH    FINDINGS:  This is a full PFT with spirometry which is of good quality, although the patient exhaled for an additional 1.5 seconds on the post-bronchodilator exam.  The spirometry revealed normal FVC and FEV1 with normal FEV1/FVC ratio.  No bronchodilator response.  The best FEV1 was a pre-bronchodilator value of 2.38 L (97% of predicted).    Lung volumes revealed normal total lung capacity with normal thoracic gas volume and residual volume.    Diffusion capacity was mildly reduced, but normalized after correction for alveolar volume.    Airway resistance was reduced.    IMPRESSION:  Essentially normal PFT.  Certainly normal spirometry and lung volumes without bronchodilator response and a low normal corrected diffusion capacity.          NAPOLEON RESENDIZ MD      D:  2025 17:21:07     T:  2025 18:04:46     KATIE/OLIVER  Job #:  [id number]     Doc#:  [id number]

## 2025-07-15 ENCOUNTER — HOSPITAL ENCOUNTER (OUTPATIENT)
Dept: PULMONOLOGY | Age: 70
Discharge: HOME | End: 2025-07-15
Attending: INTERNAL MEDICINE
Payer: MEDICARE

## 2025-07-15 DIAGNOSIS — J84.10 POSTINFLAMMATORY PULMONARY FIBROSIS (HCC): ICD-10-CM

## 2025-07-15 PROCEDURE — 94070 EVALUATION OF WHEEZING: CPT

## 2025-07-15 NOTE — PROCEDURES
[address] [address]                           PULMONARY FUNCTION      PATIENT NAME: UZMA DONAHUE                 : 1955  MED REC NO: [id number]                        ROOM:   ACCOUNT NO: [id number]                       ADMIT DATE: 07/15/2025  PROVIDER: Napoleon Resendiz MD      DATE OF PROCEDURE: 07/15/2025    SURGEON:  Napoleon Resendiz MD    REFERRING PHYSICIAN:  JARRET NORTH    STUDY:  Methacholine provocation test.    DIAGNOSIS:  J84.10, pulmonary fibrosis.    FINDINGS:  Baseline spirometry was normal.  There was no decline after saline aerosol.  Methacholine was then given in 5 stages at 25 mg/mL, but there was never any decline in FEV1.    IMPRESSION:  Negative methacholine provocation test.          NAPOLEON RESENDIZ MD      D:  07/15/2025 10:30:08     T:  07/15/2025 10:43:32     KATIE/OLIVER  Job #:  [id number]     Doc#:  [id number]

## (undated) DEVICE — COVER,TABLE,60X90,STERILE: Brand: MEDLINE

## (undated) DEVICE — CONTAINER SPEC 480ML CLR POLYSTYR 10% NEUT BUFF FRMLN ZN

## (undated) DEVICE — BLADE SAW W12.5XL70MM THK0.8MM CUT THK1.12MM S STL RECIP

## (undated) DEVICE — ELECTRODE PT RET AD L9FT HI MOIST COND ADH HYDRGEL CORDED

## (undated) DEVICE — MASK,FACE,MAXFLUIDPROTECT,SHIELD/ERLPS: Brand: MEDLINE

## (undated) DEVICE — CONTAINER SPEC COLL 960ML POLYPR TRIANG GRAD INTAKE/OUTPUT

## (undated) DEVICE — SYNCHROSEAL: Brand: DA VINCI ENERGY

## (undated) DEVICE — KIT BEDSIDE REVITAL OX 500ML

## (undated) DEVICE — INTENDED FOR TISSUE SEPARATION, AND OTHER PROCEDURES THAT REQUIRE A SHARP SURGICAL BLADE TO PUNCTURE OR CUT.: Brand: BARD-PARKER ® STAINLESS STEEL BLADES

## (undated) DEVICE — PADDING,UNDERCAST,COTTON, 4"X4YD STERILE: Brand: MEDLINE

## (undated) DEVICE — SET MAJOR INSTR ORTHO

## (undated) DEVICE — Z DISCONTINUED PER MEDLINE USE 2741944 DRESSING AQUACEL 12 IN SURG W9XL30CM SIL CVR WTRPRF VIR BACT BARR ANTIMIC

## (undated) DEVICE — APPLICATOR MEDICATED 26 CC SOLUTION HI LT ORNG CHLORAPREP

## (undated) DEVICE — SOLUTION IV IRRIG WATER 1000ML POUR BRL 2F7114

## (undated) DEVICE — PACK,EXTREMITY,ORTHOMAX,5/CS: Brand: MEDLINE

## (undated) DEVICE — BANDAGE COMPR W6INXL12FT SMOOTH FOR LIMB EXSANG ESMARCH

## (undated) DEVICE — CADIERE FORCEPS: Brand: ENDOWRIST

## (undated) DEVICE — ELECTRO LUBE IS A SINGLE PATIENT USE DEVICE THAT IS INTENDED TO BE USED ON ELECTROSURGICAL ELECTRODES TO REDUCE STICKING.: Brand: KEY SURGICAL ELECTRO LUBE

## (undated) DEVICE — BLADELESS OBTURATOR: Brand: WECK VISTA

## (undated) DEVICE — Z DISCONTINUED USE 2272124 DRAPE SURG XL N INVASIVE 2 LAYR DISP

## (undated) DEVICE — ARM DRAPE

## (undated) DEVICE — GARMENT,MEDLINE,DVT,INT,CALF,MED, GEN2: Brand: MEDLINE

## (undated) DEVICE — Z DISCONTINUED USE 2275686 GLOVE SURG SZ 8 L12IN FNGR THK13MIL WHT ISOLEX POLYISOPRENE

## (undated) DEVICE — TUBING, SUCTION, 1/4" X 10', STRAIGHT: Brand: MEDLINE

## (undated) DEVICE — SHEET DRAPE FULL 70X100

## (undated) DEVICE — KENDALL 450 SERIES MONITORING FOAM ELECTRODE - RECTANGULAR SHAPE ( 3/PK): Brand: KENDALL

## (undated) DEVICE — LUBRICANT SURG JELLY ST BACTER TUBE 4.25OZ

## (undated) DEVICE — NDL CNTR 40CT FM MAG: Brand: MEDLINE INDUSTRIES, INC.

## (undated) DEVICE — [HIGH FLOW INSUFFLATOR,  DO NOT USE IF PACKAGE IS DAMAGED,  KEEP DRY,  KEEP AWAY FROM SUNLIGHT,  PROTECT FROM HEAT AND RADIOACTIVE SOURCES.]: Brand: PNEUMOSURE

## (undated) DEVICE — GOWN,SIRUS,NONRNF,SETINSLV,XL,20/CS: Brand: MEDLINE

## (undated) DEVICE — CANNULA SEAL

## (undated) DEVICE — T4 HOOD

## (undated) DEVICE — MEGA SUTURECUT ND: Brand: ENDOWRIST

## (undated) DEVICE — TOWEL,OR,DSP,ST,BLUE,STD,6/PK,12PK/CS: Brand: MEDLINE

## (undated) DEVICE — PITCHER PT 1200ML W HNDL CSR WRP

## (undated) DEVICE — VALVE SUCTION AIR H2O HYDR H2O JET CONN STRL ORCA POD + DISP

## (undated) DEVICE — GOWN,SIRUS,POLYRNF,BRTHSLV,XLN/XL,20/CS: Brand: MEDLINE

## (undated) DEVICE — BANDAGE COMPR L W4INXL11YD 100% COT WVN E DBL LEN CLP CLSR

## (undated) DEVICE — BLADE ES L6IN ELASTOMERIC COAT EXT DURABLE BEND UPTO 90DEG

## (undated) DEVICE — COVER,LIGHT HANDLE,FLX,1/PK: Brand: MEDLINE INDUSTRIES, INC.

## (undated) DEVICE — BANDAGE COMPR W6INXL5YD SELF ADH COHESIVE CO FLX

## (undated) DEVICE — SYRINGE MED 10ML TRNSLUC BRL PLUNG BLK MRK POLYPR CTRL

## (undated) DEVICE — DOUBLE BASIN SET: Brand: MEDLINE INDUSTRIES, INC.

## (undated) DEVICE — GLOVE ORANGE PI 7 1/2   MSG9075

## (undated) DEVICE — SET ENDO INSTR LAPAROSCOPIC INCISIONAL

## (undated) DEVICE — Device

## (undated) DEVICE — WARMER SCP LAP

## (undated) DEVICE — 3M™ IOBAN™ 2 ANTIMICROBIAL INCISE DRAPE 6650EZ: Brand: IOBAN™ 2

## (undated) DEVICE — MARKER,SKIN,WI/RULER AND LABELS: Brand: MEDLINE

## (undated) DEVICE — GAUZE,SPONGE,4"X4",16PLY,STRL,LF,10/TRAY: Brand: MEDLINE

## (undated) DEVICE — SHEET SUPPORT

## (undated) DEVICE — GOWN,SIRUS,FABRNF,XL,20/CS: Brand: MEDLINE

## (undated) DEVICE — PAD,ABDOMINAL,8"X10",ST,LF: Brand: MEDLINE

## (undated) DEVICE — SYRINGE IRRIG 60ML SFT PLIABLE BLB EZ TO GRP 1 HND USE W/

## (undated) DEVICE — 3 BONE CEMENT MIXER: Brand: MIXEVAC

## (undated) DEVICE — SCOPE DAVINCI XI 30 DEG W/CORD

## (undated) DEVICE — NEEDLE SYR 18GA L1.5IN RED PLAS HUB S STL BLNT FILL W/O

## (undated) DEVICE — TOTAL TRAY, 16FR 10ML SIL FOLEY, URN: Brand: MEDLINE

## (undated) DEVICE — GOWN ISOLATN REG YEL M WT MULTIPLY SIDETIE LEV 2

## (undated) DEVICE — INSUFFLATION NEEDLE TO ESTABLISH PNEUMOPERITONEUM.: Brand: INSUFFLATION NEEDLE

## (undated) DEVICE — Z INACTIVE USE 2660664 SOLUTION IRRIG 3000ML 0.9% SOD CHL USP UROMATIC PLAS CONT

## (undated) DEVICE — 2108 SERIES SAGITTAL BLADE FLARED, GROUND  (25.0 X 0.96 X 89.5MM)

## (undated) DEVICE — SPONGE GZ 4IN 4IN 4 PLY N WVN AVANT

## (undated) DEVICE — COLUMN DRAPE

## (undated) DEVICE — PENCIL ES L3M BTTN SWCH HOLSTER W/ BLDE ELECTRD EDGE

## (undated) DEVICE — NEEDLE HYPO 25GA L1.5IN BLU POLYPR HUB S STL REG BVL STR

## (undated) DEVICE — MEDI-VAC NON-CONDUCTIVE SUCTION TUBING: Brand: CARDINAL HEALTH

## (undated) DEVICE — 6 X 9  1.75MIL 4-WALL LABGUARD: Brand: MINIGRIP COMMERCIAL LLC

## (undated) DEVICE — 3M™ STERI-DRAPE™ U-DRAPE 1015: Brand: STERI-DRAPE™

## (undated) DEVICE — MEDI-VAC YANKAUER SUCTION HANDLE W/BULBOUS TIP: Brand: CARDINAL HEALTH

## (undated) DEVICE — FORCEPS BX L240CM JAW DIA2.8MM L CAP W/ NDL MIC MESH TOOTH

## (undated) DEVICE — HANDPIECE SET WITH BONE CLEANING TIP: Brand: INTERPULSE

## (undated) DEVICE — SYRINGE MED 50ML LUERLOCK TIP

## (undated) DEVICE — Device: Brand: INSTRUSAFE

## (undated) DEVICE — SPONGE LAP W18XL18IN WHT COT 4 PLY FLD STRUNG RADPQ DISP ST

## (undated) DEVICE — TIP-UP FENESTRATED GRASPER: Brand: ENDOWRIST

## (undated) DEVICE — CANNULA IV 18GA L15IN BLNT FILL LUERLOCK HUB MJCT

## (undated) DEVICE — BLOCK BITE 60FR CAREGUARD

## (undated) DEVICE — ROLL COT W11.5INXL11FT 1LB HIGHLY ABSRB SURG GRD

## (undated) DEVICE — PLEDGET SURG W3.5XL7MM THK1.5MM WHT PTFE RECT FIRM TFE

## (undated) DEVICE — YANKAUER,BULB TIP,W/O VENT,RIGID,STERILE: Brand: MEDLINE